# Patient Record
Sex: FEMALE | URBAN - NONMETROPOLITAN AREA
[De-identification: names, ages, dates, MRNs, and addresses within clinical notes are randomized per-mention and may not be internally consistent; named-entity substitution may affect disease eponyms.]

---

## 2023-08-24 ENCOUNTER — HOSPITAL ENCOUNTER (OUTPATIENT)
Dept: WOMENS IMAGING | Age: 63
Discharge: HOME | End: 2023-08-24
Attending: RADIOLOGY

## 2023-08-24 DIAGNOSIS — Z00.6 ENCOUNTER FOR EXAMINATION FOR NORMAL COMPARISON OR CONTROL IN CLINICAL RESEARCH PROGRAM: ICD-10-CM

## 2023-09-06 ENCOUNTER — HOSPITAL ENCOUNTER (OUTPATIENT)
Dept: WOMENS IMAGING | Age: 63
Discharge: HOME | End: 2023-09-06
Attending: RADIOLOGY
Payer: MEDICARE

## 2023-09-06 VITALS — BODY MASS INDEX: 30.04 KG/M2 | HEIGHT: 60 IN | WEIGHT: 153 LBS

## 2023-09-06 DIAGNOSIS — R59.9 LYMPH NODE ENLARGEMENT: ICD-10-CM

## 2023-09-06 DIAGNOSIS — N63.25 MASS OVERLAPPING MULTIPLE QUADRANTS OF LEFT BREAST: ICD-10-CM

## 2023-09-06 DIAGNOSIS — N63.22 MASS OF UPPER INNER QUADRANT OF LEFT BREAST: ICD-10-CM

## 2023-09-06 PROCEDURE — 88305 TISSUE EXAM BY PATHOLOGIST: CPT

## 2023-09-06 PROCEDURE — 76882 US LMTD JT/FCL EVL NVASC XTR: CPT

## 2023-09-06 PROCEDURE — G0279 TOMOSYNTHESIS, MAMMO: HCPCS

## 2023-09-06 PROCEDURE — 88360 TUMOR IMMUNOHISTOCHEM/MANUAL: CPT

## 2023-09-06 PROCEDURE — 19083 BX BREAST 1ST LESION US IMAG: CPT

## 2023-09-06 NOTE — PROGRESS NOTES
Breast Biopsy Flowsheet/Post-Operative Care    Date of Procedure: 9/6/2023  Physician: Dr. Stephanie Taveras  Technologist: Starr Abarca guided breast biopsy  Lesion type: Non-palpable  Breast: left    Clock face position: Site #1: UIQ         Primary Method of Detection: Mammogram      Microcalcification's: no   Distribution: N/A      Biopsy Method:   Sertera:    Site # 1    Gauge: 14    # of Passes: 5     Clip: Geo        Pre-Op Assessment: (BI-RADS)   5.  Highly Suggestive of Malignancy    Patient Tolerated Procedure: good  Complications: n/a  Comments: n/a    Post Operative Care  Steri strips: Yes  Dressing: Gauze, Tape   Ice Applied to Site:  No  Evidence of Bleeding:  No    Pain Verbalized: No      Written Discharge Instructions: Yes  Condition at Discharge: good  Time of Discharge: [address]    Electronically signed by Angel Jefferson on 9/6/2023 at 11:31 AM

## 2023-09-06 NOTE — PROGRESS NOTES
[facility] [address] [place] [address]  Pre-Biopsy Assessment      Patient Education    Written information about procedure Yes  left   Procedural steps explained Yes Ultrasound Biopsy   Post-op potential: bruising, hematoma, pain Yes    Self-care: activity, care of dressing Yes    Patient verbalized understanding Yes    Consent signed and witnessed Yes      Hormone Therapy Status: n/a    Recent Medication: N/A Last Dose: n/a                                     Hormone Replacement Therapy: no    Previous Breast Biopsy: no    Previous Diagnosis Cancer: no    Hysterectomy:no    Emotional Status: Nervous    Language or Physical Barriers: n/a    Comments: n/a      Electronically signed by May Blackmon on 9/6/2023 at 11:30 AM

## 2023-09-07 ENCOUNTER — CLINICAL DOCUMENTATION (OUTPATIENT)
Dept: WOMENS IMAGING | Age: 63
End: 2023-09-07

## 2023-09-08 ENCOUNTER — CLINICAL DOCUMENTATION (OUTPATIENT)
Dept: CASE MANAGEMENT | Age: 63
End: 2023-09-08

## 2023-09-08 NOTE — PROGRESS NOTES
Name: Zeinab Virk  : 1960  MRN: [id number]    Oncology Navigation Follow-Up Note    Contact Type:  Telephone    Subjective: Called patient to introduce myself and navigation. Diagnosed with breast cancer 23, received results from [facility] . States \"I want to know is this curable? Can it be treated? How often is radiation? \"    Objective: left IDC and DCIS, ER+ MD+ HER2-, Sees Dr. Sonali Hansen  at 1 pm.    Assistance Needed: Transportation for radiation. States \" I have drivers but they cant do it daily. \" Denies any other needs at this time. Receptive to Advanced Care Planning / Palliative Care:  N/A, clinical stage 1A    Genetics/Family History: Sister metastatic breast cancer in her late 46s-, MGM ovarian cancer, age unknown-, father lung cancer-. Meets criteria for genetic testing. Interested in talking with Dr. Sonali Hansen further but willing to do. Education: Genetic testing, radiation, breast team's recommendations, sequence of treatment modalities    Referrals:  notified of above need, spiritual care per protocol. Notes: Questions addressed and verbalizes understanding. States can't come for teaching before Dr. Sonali Hansen appointment but can after. Address and location of [address] [place] [address] Rd given. Notified Irl Endy at surgeon's office. Emotional support offered. Expressed she has  and daughter (21yo)  for support also. Thanked me for the call and information. Will follow through continuum of care.     Electronically signed by Arleen Diallo RN on 2023 at 10:42 AM

## 2023-09-11 ENCOUNTER — OFFICE VISIT (OUTPATIENT)
Dept: SURGERY | Age: 63
End: 2023-09-11
Payer: MEDICARE

## 2023-09-11 ENCOUNTER — TELEPHONE (OUTPATIENT)
Dept: SURGERY | Age: 63
End: 2023-09-11

## 2023-09-11 ENCOUNTER — CLINICAL DOCUMENTATION (OUTPATIENT)
Dept: CASE MANAGEMENT | Age: 63
End: 2023-09-11

## 2023-09-11 VITALS
HEIGHT: 60 IN | DIASTOLIC BLOOD PRESSURE: 82 MMHG | BODY MASS INDEX: 30.98 KG/M2 | WEIGHT: 157.8 LBS | HEART RATE: 90 BPM | OXYGEN SATURATION: 96 % | SYSTOLIC BLOOD PRESSURE: 148 MMHG | TEMPERATURE: 97.7 F

## 2023-09-11 DIAGNOSIS — M81.0 OSTEOPOROSIS WITHOUT CURRENT PATHOLOGICAL FRACTURE, UNSPECIFIED OSTEOPOROSIS TYPE: ICD-10-CM

## 2023-09-11 DIAGNOSIS — C50.212 CARCINOMA OF UPPER-INNER QUADRANT OF LEFT BREAST IN FEMALE, ESTROGEN RECEPTOR POSITIVE (HCC): Primary | ICD-10-CM

## 2023-09-11 DIAGNOSIS — Z17.0 CARCINOMA OF UPPER-INNER QUADRANT OF LEFT BREAST IN FEMALE, ESTROGEN RECEPTOR POSITIVE (HCC): Primary | ICD-10-CM

## 2023-09-11 PROCEDURE — 99205 OFFICE O/P NEW HI 60 MIN: CPT | Performed by: SURGERY

## 2023-09-11 PROCEDURE — 1123F ACP DISCUSS/DSCN MKR DOCD: CPT | Performed by: SURGERY

## 2023-09-11 RX ORDER — SERTRALINE HYDROCHLORIDE 25 MG/1
25 TABLET, FILM COATED ORAL DAILY
COMMUNITY
Start: 2023-07-18

## 2023-09-11 RX ORDER — FLUTICASONE PROPIONATE 110 UG/1
2 AEROSOL, METERED RESPIRATORY (INHALATION) 2 TIMES DAILY
COMMUNITY
Start: 2023-08-27

## 2023-09-11 RX ORDER — CETIRIZINE HYDROCHLORIDE 10 MG/1
10 TABLET ORAL DAILY
COMMUNITY

## 2023-09-11 NOTE — PROGRESS NOTES
Name: Delphine Magallon  : 1960  MRN: [id number]    Oncology Navigation- Initial Note:    Intake-  Contact Type:  Cancer Center-Teaching with Navigator    Diagnosis: Breast-malignant    Home Disposition: Lives with other who is able to assist,  [employer]    Patient needs and barriers to care: Coordination of Care, Knowledge deficit, Emotional Issues/ Fear/ Anxiety, and Transportation- already aware-see previous note     Referral Source: Outside Provider    Interventions-   General Interventions: Arrived with  for breast cancer teaching. Just left from appointment with Dr. Tracie Shelton. Scheduled for lumpectomy with sentinel node on . Expressing anxiety about [place]. Unable to change until open enrollment in October. Decided she does not want genetic testing. Education/Screenings:  yes - Breast Cancer Information Packet, Navigation packet, Pre-op: Lymphedema, exercises after breast surgery, post-op pillow, pre-hab, sentinel node, Oncotype DX, genetic evaluation and testing, Breast Cancer and Nutrition. Currently on HRT: no     Continuum of Care: Diagnosis/Active Treatment    Notes: Teaching completed and verbalizes understanding. All questions addressed. States talked to Dr. Tracie Shelton about referral to Dr. Talia Davalos due to insurance. Patient states has history osteoporosis. Takes walks with  often. Discussed antiestrogen pill and starting Calcium and vitamin D supplements for bones. Wants called with recommended dosages. Interested in doing everything she can. Told her I will call once I verify. Asked about having a navigator throughout treatment. Informed her once she transitions to Dr. Talia Davalos his nurses will take over. Will follow through surgery and radiation if has here.      Electronically signed by Jennifer Mitchell RN on 2023 at 3:23 PM pt s/p  and plans to breastfeed

## 2023-09-11 NOTE — PROGRESS NOTES
Maximiliano Garcia MD   General Surgery  New Patient Evaluation in Office  Pt Name: Patel Thomas  Date of Birth 1960   Today's Date: 9/11/2023  Medical Record Number: [id number]  Referring Provider: Dr. Judith Epstein  Primary Care Provider: Gucci Thomas DO  Chief Complaint:  Chief Complaint   Patient presents with    Surgical Consult     NP refer [facility]-Left breast invasive ductal carcinoma       ASSESSMENT      1. 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 Osteoporosis without current pathological fracture, unspecified osteoporosis type       Clinical stage Ia ER positive NE positive HER2 negative  PLANS      Imaging independently reviewed. Discussed with patient surgical treatment options for early stage hormone responsive breast cancer. Breast conservation with sentinel lymph node biopsy postoperative radiation versus mastectomy with or without reconstruction and sentinel lymph node biopsy discussed with patient. She desires to proceed with breast conservation therapy. 3 .oncology rehabilitation  4. Family history reviewed. Patient declines genetic testing. 5.  Discussed bilateral breast MRI patient does have some central densities bilaterally which could obscure additional lesion. She has anxiety some claustrophobia she opted for no MRI at this time. 6.  Oncology prehabilitation. 7.  Postoperative medical and radiation oncology consultations. Discussed need for antiestrogen therapy postoperatively as well as recommendations for radiation therapy with breast conservation surgery. Recommendations for any potential chemotherapy based on final pathology Oncotype DX score. Patient plans on seeing Dr. Alonso Arnold. 8.  Oncotype DX score. 9.  Techniques and risk of procedure were discussed with patient. Risk include but not limited to bleeding, infection, failure to achieve negative margins with need for reexcision.   Recurrence of cancer, poor cosmetic result,

## 2023-09-12 ENCOUNTER — CLINICAL DOCUMENTATION (OUTPATIENT)
Dept: CASE MANAGEMENT | Age: 63
End: 2023-09-12

## 2023-09-12 ENCOUNTER — SOCIAL WORK (OUTPATIENT)
Dept: INFUSION THERAPY | Age: 63
End: 2023-09-12

## 2023-09-12 DIAGNOSIS — R92.2 DENSE BREAST TISSUE: ICD-10-CM

## 2023-09-12 DIAGNOSIS — Z17.0 CARCINOMA OF UPPER-INNER QUADRANT OF LEFT BREAST IN FEMALE, ESTROGEN RECEPTOR POSITIVE (HCC): Primary | ICD-10-CM

## 2023-09-12 DIAGNOSIS — C50.212 CARCINOMA OF UPPER-INNER QUADRANT OF LEFT BREAST IN FEMALE, ESTROGEN RECEPTOR POSITIVE (HCC): Primary | ICD-10-CM

## 2023-09-12 PROBLEM — M81.0 OSTEOPOROSIS WITHOUT CURRENT PATHOLOGICAL FRACTURE: Status: ACTIVE | Noted: 2023-09-12

## 2023-09-12 ASSESSMENT — ENCOUNTER SYMPTOMS
ABDOMINAL PAIN: 0
TROUBLE SWALLOWING: 0
WHEEZING: 0
COUGH: 0
BLOOD IN STOOL: 0
VOMITING: 0
SHORTNESS OF BREATH: 0
NAUSEA: 0
SORE THROAT: 0
VOICE CHANGE: 0
COLOR CHANGE: 0

## 2023-09-12 NOTE — PROGRESS NOTES
- This staff was contacted by Cooper's nurse navigator to assist in transportation tomorrow morning for an MRI appt. - Cooper will be picked up by [facility] around 4:30 for a 5:45 appt. - Upon completion of the MRI she will be transported back to her home. - Continued support will be available from the  if and when needed.

## 2023-09-12 NOTE — PROGRESS NOTES
Name: Misha Ingram  : 1960  MRN: [id number]    Oncology Navigation Follow-Up Note    Contact Type:  Telephone    Notes: Patient called here, very tearful. States \"Not having a good day. Didn't know who else to call. Found out 3 paternal aunts had breast cancer too. With my sister's breast cancer maybe I should have mastectomy instead of lumpectomy. I want the MRI that Dr. Brett Whaley recommended. \" Encouraged genetic testing if debating about which surgery to help with decision. Agreed to do. Worried about getting surgery in time since has [employer]. Can order stat panel per protocol. Emotional support offered. Called Adam West of patient's request for MRI.       4:23 pm MRI had cancellation for tomorrow at 6 am, patient to arrive by 530 am. Called patient, appointment information and location given. Coming for genetic lab after MRI.      Electronically signed by Mark Crisostomo RN on 2023 at 4:18 PM

## 2023-09-13 ENCOUNTER — HOSPITAL ENCOUNTER (OUTPATIENT)
Dept: MRI IMAGING | Age: 63
Discharge: HOME | End: 2023-09-13
Attending: SURGERY
Payer: MEDICARE

## 2023-09-13 ENCOUNTER — CLINICAL DOCUMENTATION (OUTPATIENT)
Dept: CASE MANAGEMENT | Age: 63
End: 2023-09-13

## 2023-09-13 DIAGNOSIS — Z17.0 CARCINOMA OF UPPER-INNER QUADRANT OF LEFT BREAST IN FEMALE, ESTROGEN RECEPTOR POSITIVE (HCC): ICD-10-CM

## 2023-09-13 DIAGNOSIS — R92.2 DENSE BREAST TISSUE: ICD-10-CM

## 2023-09-13 DIAGNOSIS — C50.212 CARCINOMA OF UPPER-INNER QUADRANT OF LEFT BREAST IN FEMALE, ESTROGEN RECEPTOR POSITIVE (HCC): ICD-10-CM

## 2023-09-13 DIAGNOSIS — Z80.3 FAMILY HISTORY OF MALIGNANT NEOPLASM OF BREAST: ICD-10-CM

## 2023-09-13 DIAGNOSIS — Z85.3 PERSONAL HISTORY OF MALIGNANT NEOPLASM OF BREAST: ICD-10-CM

## 2023-09-13 DIAGNOSIS — Z80.41 FAMILY HISTORY OF MALIGNANT NEOPLASM OF OVARY: ICD-10-CM

## 2023-09-13 PROCEDURE — A9579 GAD-BASE MR CONTRAST NOS,1ML: HCPCS | Performed by: SURGERY

## 2023-09-13 PROCEDURE — C8908 MRI W/O FOL W/CONT, BREAST,: HCPCS

## 2023-09-13 PROCEDURE — 6360000004 HC RX CONTRAST MEDICATION: Performed by: SURGERY

## 2023-09-13 RX ADMIN — GADOTERIDOL 15 ML: 279.3 INJECTION, SOLUTION INTRAVENOUS at 07:00

## 2023-09-13 NOTE — PROGRESS NOTES
Name: Herminio Rosado  : 1960  MRN: [id number]    Oncology Navigation Follow-Up Note    Contact Type:  Medical Oncology-genetic lab draw     Notes: Wants genetic testing.  collected blood sample with Neri Kit and labeled. Navigator packaged kit per protocol, including copy of order, pedigree, pathology, and insurance information. Scheduled [employer] pickup for today, tracking number in media. Radha Billet brochure, billing sheet, and genetic counseling scheduling instructions for pre-test session. Patient asked for help scheduling. Pre-test session 9/15. Informed when results available will get call to schedule post-test session. Verbalizes understanding. Dosages and instructions given for Calcium and Vitamin D per patient request. Going to start now per patient.     Electronically signed by Patrick Mott RN on 2023 at 8:18 AM

## 2023-09-14 ENCOUNTER — TELEPHONE (OUTPATIENT)
Dept: SURGERY | Age: 63
End: 2023-09-14

## 2023-09-14 NOTE — TELEPHONE ENCOUNTER
Spoke w/ Dr. Greenwood Webster regarding MRI results, she would like this new area localized day of surgery also, ok for both needle localizations to be under MRI guidance    Spoke w/ pt, informed her of above, due to scheduling conflicts and genetic testing results possibly not being back by the 20th it's in her best interest to r/s surgery to 9/27. Pt is in agreement. Spoke w/ Adrianna Monroy at [facility] to get MRI needle loc x2 scheduled for 9/27 at 8 am.   SLN injection rescheduled to 9/27 at 9 am.   I asked pt to arrive to [facility] on 9/27 at 7:15 am. She voiced understanding.

## 2023-09-15 ENCOUNTER — TELEPHONE (OUTPATIENT)
Dept: CASE MANAGEMENT | Age: 63
End: 2023-09-15

## 2023-09-15 NOTE — TELEPHONE ENCOUNTER
Name: Oskar Gutiérrez  : 1960  MRN: [id number]    Oncology Navigation Follow-Up Note    Contact Type:  Telephone    Notes: Patient assisted with scheduling pre GIS with Emmett Hedrick.     Electronically signed by Andrew Patino RN on 9/15/2023 at 3:15 PM

## 2023-09-19 ENCOUNTER — SOCIAL WORK (OUTPATIENT)
Dept: INFUSION THERAPY | Age: 63
End: 2023-09-19

## 2023-09-19 ENCOUNTER — HOSPITAL ENCOUNTER (OUTPATIENT)
Dept: PHYSICAL THERAPY | Age: 63
Setting detail: THERAPIES SERIES
Discharge: HOME | End: 2023-09-19
Payer: MEDICARE

## 2023-09-19 LAB
Lab: NEGATIVE
Lab: NORMAL

## 2023-09-19 PROCEDURE — 97110 THERAPEUTIC EXERCISES: CPT

## 2023-09-19 PROCEDURE — 97161 PT EVAL LOW COMPLEX 20 MIN: CPT

## 2023-09-19 NOTE — PROGRESS NOTES
Oncology Social Work    Date: 9/19/2023  Time: 12:19 PM  Name: Corazon Mathis  MRN: [id number]     Contact Type: Follow-up    Note:   Situation: This staff called Corazon Mathis via phone support to introduce myself as her Oncology Social Worker. Background:  Cooper was referred to this staff by the General Surgery Dept to complete her Distress Thermometer she received during her initial appointment. This staff was calling to assess her self-identified distress concerns. Assessment: There was also no opportunity to discuss directly with Cooper since the attempt to contact her went directly to [facility].   - Education regarding the services provided by the SW were relayed on the message left for her.  - No community referrals were placed at this time. Referral services may be reconsidered should she contact me regarding assistance. This patient has already needed assistance in transportation so encouraging her to utilize the local transportation through the [id number] [employer] will be addressed. Recommendation: Follow-up will be initiated by Cooper based on need.  provided her with my contact information and will remain available for support.         JOSE LUIS Dennis LSW, ACHP-SW  Oncology Social Worker      Electronically signed by JOSE LUIS Dennis LSW, ACHP-SW on 9/19/2023 at 12:19 PM

## 2023-09-19 NOTE — PROGRESS NOTES
further oncologic treatment to allow full return to her previous level of activities for quality of life with survivorship. Body Structures/Functions/Activity Limitations:Lymphedema Risk  Prognosis: good    GOALS:  Patient Goal: \"Have good movement. \"    Short Term Goals to be met in 12 weeks:  See LTGs    Long Term Goals to be met in 12 weeks:   Pt to demo left shoulder PROM flexion returned to 180 deg after surgery for ADLs. Pt to demo left shoulder PROM abduction returned to 180 deg after surgery for ADLs. Pt to demo left shoulder strength returned to 5/5 after surgery for IADLs. Pt to demo QuickDASH score returned to 0% after surgery for return to previous level of functioning for survivorship. Pt to demo left UE lymphedema measures controlled at <143 cm with independence with individualized lymphedema risk reduction strategies based on personal risk factors and when to seek medical attention for assessment of early signs of lymphedema or cellulitis in the affected region. Patient Education: Plan of care, goals. See \"Treatment Initiated\" for further details. Education Outcome: Verbalized understanding  Education Barriers: None    PLAN:  Treatment Recommendations: Strengthening, Range of Motion, Conditioning, Lymphedema Management, Manual Techniques, Pain Management, Neuropathy Management, Postural Re-Training, Body Mechanics/Ergonomics, Home Exercise Prescription, and Safety Education    Plan of care initiated. Plan to see patient up to 2 times per week for up to 12 weeks to address the treatment planned outlined above, with next appointment to be 3 weeks following surgery per protocol.     Time In 1100   Time Out 1155   Timed Code Minutes: 10 min   Total Treatment Time: 55 min       Electronically Signed by: Reyna Kelly, PT  9/19/2023

## 2023-09-20 ENCOUNTER — TELEPHONE (OUTPATIENT)
Dept: CASE MANAGEMENT | Age: 63
End: 2023-09-20

## 2023-09-20 ENCOUNTER — APPOINTMENT (OUTPATIENT)
Dept: WOMENS IMAGING | Age: 63
End: 2023-09-20
Attending: SURGERY
Payer: MEDICARE

## 2023-09-20 NOTE — TELEPHONE ENCOUNTER
Name: Sarah Current  : 1960  MRN: [id number]    Oncology Navigation Follow-Up Note    Contact Type:  Telephone    Notes: Scheduled patient for post-genetic session with Janel Patel to review results on  at 10 am. Verbalizes understanding.      Electronically signed by Juancho Denton RN on 2023 at 1:15 PM

## 2023-09-27 ENCOUNTER — HOSPITAL ENCOUNTER (OUTPATIENT)
Age: 63
Setting detail: OUTPATIENT SURGERY
Discharge: HOME | End: 2023-09-27
Attending: SURGERY | Admitting: SURGERY
Payer: MEDICARE

## 2023-09-27 ENCOUNTER — ANESTHESIA (OUTPATIENT)
Dept: OPERATING ROOM | Age: 63
End: 2023-09-27
Payer: MEDICARE

## 2023-09-27 ENCOUNTER — HOSPITAL ENCOUNTER (OUTPATIENT)
Dept: WOMENS IMAGING | Age: 63
Discharge: HOME | End: 2023-09-27
Attending: SURGERY
Payer: MEDICARE

## 2023-09-27 ENCOUNTER — HOSPITAL ENCOUNTER (OUTPATIENT)
Dept: MRI IMAGING | Age: 63
Discharge: HOME | End: 2023-09-27
Attending: SURGERY
Payer: MEDICARE

## 2023-09-27 ENCOUNTER — HOSPITAL ENCOUNTER (OUTPATIENT)
Dept: NUCLEAR MEDICINE | Age: 63
Discharge: HOME | End: 2023-09-27
Attending: SURGERY
Payer: MEDICARE

## 2023-09-27 ENCOUNTER — ANESTHESIA EVENT (OUTPATIENT)
Dept: OPERATING ROOM | Age: 63
End: 2023-09-27
Payer: MEDICARE

## 2023-09-27 VITALS
OXYGEN SATURATION: 96 % | DIASTOLIC BLOOD PRESSURE: 85 MMHG | RESPIRATION RATE: 20 BRPM | SYSTOLIC BLOOD PRESSURE: 158 MMHG | HEART RATE: 96 BPM | TEMPERATURE: 98.3 F

## 2023-09-27 VITALS
RESPIRATION RATE: 20 BRPM | DIASTOLIC BLOOD PRESSURE: 82 MMHG | HEIGHT: 60 IN | BODY MASS INDEX: 32.2 KG/M2 | TEMPERATURE: 97 F | OXYGEN SATURATION: 95 % | SYSTOLIC BLOOD PRESSURE: 159 MMHG | WEIGHT: 164 LBS | HEART RATE: 94 BPM

## 2023-09-27 DIAGNOSIS — Z17.0 MALIGNANT NEOPLASM OF UPPER-INNER QUADRANT OF LEFT BREAST IN FEMALE, ESTROGEN RECEPTOR POSITIVE (HCC): ICD-10-CM

## 2023-09-27 DIAGNOSIS — C50.212 MALIGNANT NEOPLASM OF UPPER-INNER QUADRANT OF LEFT BREAST IN FEMALE, ESTROGEN RECEPTOR POSITIVE (HCC): ICD-10-CM

## 2023-09-27 DIAGNOSIS — C50.912 INVASIVE DUCTAL CARCINOMA OF BREAST, LEFT (HCC): ICD-10-CM

## 2023-09-27 DIAGNOSIS — C50.212 CARCINOMA OF UPPER-INNER QUADRANT OF LEFT BREAST IN FEMALE, ESTROGEN RECEPTOR POSITIVE (HCC): Primary | ICD-10-CM

## 2023-09-27 DIAGNOSIS — Z17.0 CARCINOMA OF UPPER-INNER QUADRANT OF LEFT BREAST IN FEMALE, ESTROGEN RECEPTOR POSITIVE (HCC): Primary | ICD-10-CM

## 2023-09-27 DIAGNOSIS — C50.212 CARCINOMA OF UPPER-INNER QUADRANT OF LEFT BREAST IN FEMALE, ESTROGEN RECEPTOR POSITIVE (HCC): ICD-10-CM

## 2023-09-27 DIAGNOSIS — Z17.0 CARCINOMA OF UPPER-INNER QUADRANT OF LEFT BREAST IN FEMALE, ESTROGEN RECEPTOR POSITIVE (HCC): ICD-10-CM

## 2023-09-27 PROCEDURE — 2500000003 HC RX 250 WO HCPCS: Performed by: SURGERY

## 2023-09-27 PROCEDURE — 3430000000 HC RX DIAGNOSTIC RADIOPHARMACEUTICAL: Performed by: SURGERY

## 2023-09-27 PROCEDURE — 2500000003 HC RX 250 WO HCPCS: Performed by: NURSE ANESTHETIST, CERTIFIED REGISTERED

## 2023-09-27 PROCEDURE — 6360000002 HC RX W HCPCS: Performed by: NURSE ANESTHETIST, CERTIFIED REGISTERED

## 2023-09-27 PROCEDURE — 19301 PARTIAL MASTECTOMY: CPT | Performed by: SURGERY

## 2023-09-27 PROCEDURE — 6360000002 HC RX W HCPCS: Performed by: SURGERY

## 2023-09-27 PROCEDURE — 2580000003 HC RX 258: Performed by: SURGERY

## 2023-09-27 PROCEDURE — 7100000001 HC PACU RECOVERY - ADDTL 15 MIN: Performed by: SURGERY

## 2023-09-27 PROCEDURE — 2709999900 HC NON-CHARGEABLE SUPPLY: Performed by: SURGERY

## 2023-09-27 PROCEDURE — 3600000012 HC SURGERY LEVEL 2 ADDTL 15MIN: Performed by: SURGERY

## 2023-09-27 PROCEDURE — G0279 TOMOSYNTHESIS, MAMMO: HCPCS

## 2023-09-27 PROCEDURE — 76098 X-RAY EXAM SURGICAL SPECIMEN: CPT

## 2023-09-27 PROCEDURE — 6370000000 HC RX 637 (ALT 250 FOR IP): Performed by: SURGERY

## 2023-09-27 PROCEDURE — 7100000011 HC PHASE II RECOVERY - ADDTL 15 MIN: Performed by: SURGERY

## 2023-09-27 PROCEDURE — 7100000010 HC PHASE II RECOVERY - FIRST 15 MIN: Performed by: SURGERY

## 2023-09-27 PROCEDURE — 88307 TISSUE EXAM BY PATHOLOGIST: CPT

## 2023-09-27 PROCEDURE — 3700000000 HC ANESTHESIA ATTENDED CARE: Performed by: SURGERY

## 2023-09-27 PROCEDURE — A9541 TC99M SULFUR COLLOID: HCPCS | Performed by: SURGERY

## 2023-09-27 PROCEDURE — 38792 RA TRACER ID OF SENTINL NODE: CPT

## 2023-09-27 PROCEDURE — 19285 PERQ DEV BREAST 1ST US IMAG: CPT

## 2023-09-27 PROCEDURE — 19287 PERQ DEV BREAST 1ST MR GUIDE: CPT

## 2023-09-27 PROCEDURE — 7100000000 HC PACU RECOVERY - FIRST 15 MIN: Performed by: SURGERY

## 2023-09-27 PROCEDURE — 3700000001 HC ADD 15 MINUTES (ANESTHESIA): Performed by: SURGERY

## 2023-09-27 PROCEDURE — 38525 BIOPSY/REMOVAL LYMPH NODES: CPT | Performed by: SURGERY

## 2023-09-27 PROCEDURE — 3600000002 HC SURGERY LEVEL 2 BASE: Performed by: SURGERY

## 2023-09-27 RX ORDER — SODIUM CHLORIDE 9 MG/ML
INJECTION, SOLUTION INTRAVENOUS CONTINUOUS
Status: DISCONTINUED | OUTPATIENT
Start: 2023-09-27 | End: 2023-09-27 | Stop reason: HOSPADM

## 2023-09-27 RX ORDER — SODIUM CHLORIDE 0.9 % (FLUSH) 0.9 %
5-40 SYRINGE (ML) INJECTION EVERY 12 HOURS SCHEDULED
Status: DISCONTINUED | OUTPATIENT
Start: 2023-09-27 | End: 2023-09-27 | Stop reason: HOSPADM

## 2023-09-27 RX ORDER — ACETAMINOPHEN 325 MG/1
650 TABLET ORAL EVERY 4 HOURS PRN
Status: DISCONTINUED | OUTPATIENT
Start: 2023-09-27 | End: 2023-09-27 | Stop reason: HOSPADM

## 2023-09-27 RX ORDER — PROPOFOL 10 MG/ML
INJECTION, EMULSION INTRAVENOUS PRN
Status: DISCONTINUED | OUTPATIENT
Start: 2023-09-27 | End: 2023-09-27 | Stop reason: SDUPTHER

## 2023-09-27 RX ORDER — MORPHINE SULFATE 2 MG/ML
2 INJECTION, SOLUTION INTRAMUSCULAR; INTRAVENOUS
Status: DISCONTINUED | OUTPATIENT
Start: 2023-09-27 | End: 2023-09-27 | Stop reason: HOSPADM

## 2023-09-27 RX ORDER — TRAMADOL HYDROCHLORIDE 50 MG/1
50 TABLET ORAL EVERY 6 HOURS PRN
Status: DISCONTINUED | OUTPATIENT
Start: 2023-09-27 | End: 2023-09-27 | Stop reason: HOSPADM

## 2023-09-27 RX ORDER — KETOROLAC TROMETHAMINE 30 MG/ML
INJECTION, SOLUTION INTRAMUSCULAR; INTRAVENOUS PRN
Status: DISCONTINUED | OUTPATIENT
Start: 2023-09-27 | End: 2023-09-27 | Stop reason: SDUPTHER

## 2023-09-27 RX ORDER — ONDANSETRON 2 MG/ML
INJECTION INTRAMUSCULAR; INTRAVENOUS PRN
Status: DISCONTINUED | OUTPATIENT
Start: 2023-09-27 | End: 2023-09-27 | Stop reason: SDUPTHER

## 2023-09-27 RX ORDER — SODIUM CHLORIDE 9 MG/ML
INJECTION, SOLUTION INTRAVENOUS PRN
Status: DISCONTINUED | OUTPATIENT
Start: 2023-09-27 | End: 2023-09-27 | Stop reason: HOSPADM

## 2023-09-27 RX ORDER — SODIUM CHLORIDE 0.9 % (FLUSH) 0.9 %
5-40 SYRINGE (ML) INJECTION PRN
Status: DISCONTINUED | OUTPATIENT
Start: 2023-09-27 | End: 2023-09-27 | Stop reason: HOSPADM

## 2023-09-27 RX ORDER — TRAMADOL HYDROCHLORIDE 50 MG/1
100 TABLET ORAL EVERY 6 HOURS PRN
Status: DISCONTINUED | OUTPATIENT
Start: 2023-09-27 | End: 2023-09-27 | Stop reason: HOSPADM

## 2023-09-27 RX ORDER — ONDANSETRON 2 MG/ML
4 INJECTION INTRAMUSCULAR; INTRAVENOUS EVERY 6 HOURS PRN
Status: DISCONTINUED | OUTPATIENT
Start: 2023-09-27 | End: 2023-09-27 | Stop reason: HOSPADM

## 2023-09-27 RX ORDER — KETAMINE HCL IN NACL, ISO-OSM 100MG/10ML
SYRINGE (ML) INJECTION PRN
Status: DISCONTINUED | OUTPATIENT
Start: 2023-09-27 | End: 2023-09-27 | Stop reason: SDUPTHER

## 2023-09-27 RX ORDER — MORPHINE SULFATE 4 MG/ML
4 INJECTION, SOLUTION INTRAMUSCULAR; INTRAVENOUS
Status: DISCONTINUED | OUTPATIENT
Start: 2023-09-27 | End: 2023-09-27 | Stop reason: HOSPADM

## 2023-09-27 RX ORDER — ONDANSETRON 4 MG/1
4 TABLET, ORALLY DISINTEGRATING ORAL EVERY 8 HOURS PRN
Status: DISCONTINUED | OUTPATIENT
Start: 2023-09-27 | End: 2023-09-27 | Stop reason: HOSPADM

## 2023-09-27 RX ORDER — GLYCOPYRROLATE 1 MG/5 ML
SYRINGE (ML) INTRAVENOUS PRN
Status: DISCONTINUED | OUTPATIENT
Start: 2023-09-27 | End: 2023-09-27 | Stop reason: SDUPTHER

## 2023-09-27 RX ORDER — DEXAMETHASONE SODIUM PHOSPHATE 4 MG/ML
INJECTION, SOLUTION INTRA-ARTICULAR; INTRALESIONAL; INTRAMUSCULAR; INTRAVENOUS; SOFT TISSUE PRN
Status: DISCONTINUED | OUTPATIENT
Start: 2023-09-27 | End: 2023-09-27 | Stop reason: SDUPTHER

## 2023-09-27 RX ORDER — FENTANYL CITRATE 50 UG/ML
INJECTION, SOLUTION INTRAMUSCULAR; INTRAVENOUS PRN
Status: DISCONTINUED | OUTPATIENT
Start: 2023-09-27 | End: 2023-09-27 | Stop reason: SDUPTHER

## 2023-09-27 RX ORDER — LIDOCAINE HCL/PF 100 MG/5ML
SYRINGE (ML) INJECTION PRN
Status: DISCONTINUED | OUTPATIENT
Start: 2023-09-27 | End: 2023-09-27 | Stop reason: SDUPTHER

## 2023-09-27 RX ORDER — TRAMADOL HYDROCHLORIDE 50 MG/1
50 TABLET ORAL EVERY 4 HOURS PRN
Qty: 18 TABLET | Refills: 0 | Status: SHIPPED | OUTPATIENT
Start: 2023-09-27 | End: 2023-09-30

## 2023-09-27 RX ORDER — ESMOLOL HYDROCHLORIDE 10 MG/ML
INJECTION INTRAVENOUS PRN
Status: DISCONTINUED | OUTPATIENT
Start: 2023-09-27 | End: 2023-09-27 | Stop reason: SDUPTHER

## 2023-09-27 RX ORDER — MIDAZOLAM HYDROCHLORIDE 1 MG/ML
INJECTION INTRAMUSCULAR; INTRAVENOUS PRN
Status: DISCONTINUED | OUTPATIENT
Start: 2023-09-27 | End: 2023-09-27 | Stop reason: SDUPTHER

## 2023-09-27 RX ADMIN — Medication 2000 MG: at 12:53

## 2023-09-27 RX ADMIN — Medication 0.88 MILLICURIE: at 11:05

## 2023-09-27 RX ADMIN — ESMOLOL HYDROCHLORIDE 50 MG: 10 INJECTION, SOLUTION INTRAVENOUS at 13:01

## 2023-09-27 RX ADMIN — TRAMADOL HYDROCHLORIDE 100 MG: 50 TABLET, COATED ORAL at 15:17

## 2023-09-27 RX ADMIN — PROPOFOL 50 MG: 10 INJECTION, EMULSION INTRAVENOUS at 12:45

## 2023-09-27 RX ADMIN — DEXAMETHASONE SODIUM PHOSPHATE 8 MG: 4 INJECTION, SOLUTION INTRAMUSCULAR; INTRAVENOUS at 12:43

## 2023-09-27 RX ADMIN — KETOROLAC TROMETHAMINE 15 MG: 30 INJECTION, SOLUTION INTRAMUSCULAR; INTRAVENOUS at 13:34

## 2023-09-27 RX ADMIN — Medication 15 MG: at 12:43

## 2023-09-27 RX ADMIN — ESMOLOL HYDROCHLORIDE 30 MG: 10 INJECTION, SOLUTION INTRAVENOUS at 12:43

## 2023-09-27 RX ADMIN — PROPOFOL 150 MG: 10 INJECTION, EMULSION INTRAVENOUS at 12:43

## 2023-09-27 RX ADMIN — ONDANSETRON 4 MG: 2 INJECTION INTRAMUSCULAR; INTRAVENOUS at 13:34

## 2023-09-27 RX ADMIN — Medication 80 MG: at 12:43

## 2023-09-27 RX ADMIN — FENTANYL CITRATE 25 MCG: 50 INJECTION, SOLUTION INTRAMUSCULAR; INTRAVENOUS at 13:22

## 2023-09-27 RX ADMIN — Medication 0.2 MG: at 13:36

## 2023-09-27 RX ADMIN — SODIUM CHLORIDE: 9 INJECTION, SOLUTION INTRAVENOUS at 12:19

## 2023-09-27 RX ADMIN — ESMOLOL HYDROCHLORIDE 20 MG: 10 INJECTION, SOLUTION INTRAVENOUS at 13:17

## 2023-09-27 RX ADMIN — MIDAZOLAM 1 MG: 1 INJECTION INTRAMUSCULAR; INTRAVENOUS at 12:39

## 2023-09-27 ASSESSMENT — PAIN DESCRIPTION - ORIENTATION
ORIENTATION: LEFT

## 2023-09-27 ASSESSMENT — PAIN DESCRIPTION - LOCATION
LOCATION: BREAST

## 2023-09-27 ASSESSMENT — PAIN SCALES - GENERAL
PAINLEVEL_OUTOF10: 8
PAINLEVEL_OUTOF10: 7
PAINLEVEL_OUTOF10: 4

## 2023-09-27 ASSESSMENT — PAIN DESCRIPTION - PAIN TYPE: TYPE: ACUTE PAIN

## 2023-09-27 ASSESSMENT — PAIN DESCRIPTION - DESCRIPTORS
DESCRIPTORS: SORE
DESCRIPTORS: ACHING;SORE

## 2023-09-27 ASSESSMENT — PAIN - FUNCTIONAL ASSESSMENT
PAIN_FUNCTIONAL_ASSESSMENT: 0-10
PAIN_FUNCTIONAL_ASSESSMENT: 0-10

## 2023-09-27 NOTE — PROGRESS NOTES
1356 - pt arrives to pacu, respirations unlabored on 8 L simple mask, pt denies pain, VSS    1410 - pt denies pain at this time    1426 - pt meets criteria for discharge from pacu at this time, pt transported to [facility] in stable condition

## 2023-09-27 NOTE — OP NOTE
[facility]  Operative Report    PATIENT NAME: Kirsten Mcmanus RECORD NO. 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 Additional MRI detected lesion. POSTOPERATIVE DIAGNOSIS: Same, path pending  SURGEON:  Fermin Cardoso MD MD   ANESTHESIA:  General LMA anesthesia and local  ANESTHESIA:  30  ml OF 0.5% Marcaine and 1% xylocaine in equal parts  ESTIMATED BLOOD LOSS:  50  ml  SPECIMEN: to pathology  COMPLICATIONS:  None; patient tolerated the procedure well. DRAINS: none  DISPOSITION: Recovery Room  CONDITION: stable      Narrative:    Procedure: Patient was brought to operating suite placed supine on the operating table after wire localization and injection with Dr. Waleska Khan sulfur colloid was performed in the left breast preoperatively. She was placed supine on the operating table with pneumatic sequential compression devices on lower extremities. She was given Ancef intravenously. After induction of general anesthesia left breast and axilla were prepped and draped and timeout was performed. Utilizing the neoprobe to guide the incision incision was made the left axilla dissection was carried down to the clavipectoral fracture and through to the deep axilla. A sentinel lymph node with ex vivo counts of around 185 was removed there were no palpable enlarged lymph nodes no further hot lymph nodes. Wound was irrigated and skin was closed in layers of absorbable suture. The dermis had been closed with interrupted 3-0 Vicryl suture.   Skin glue was applied at the end of the procedure after infiltrated with local anesthetic. Attention was then turned towards the lumpectomy on the left. Skin incision was made there were 3 wires in the breast.  After skin incision was made wires were delivered in the operative field lumpectomy including all 3 wires was taken as 1 specimen. Specimen was oriented at with markers. Clips were placed in the lumpectomy cavity. Specimen was sent for specimen radiograph. Reported by radiology contain both areas of interest in all 3 wires and the barbell clip. Additional margin had been taken in the area of the documented tumor specimen was somewhat bivalved. After the wound was irrigated dermis was closed with interrupted 3-0 Vicryl suture and skin was closed running subcuticular 4-0 Monocryl suture. Skin glue was applied. Patient was spontaneously breathing and transported to the recovery area in stable condition. Synoptic QI [place] Lymph Node Biopsy for Breast Cancer  Element Response   Operation performed with curative intent Yes   Tracer(s) used to identify sentinel nodes in the upfront surgery (non-neoadjuvant) setting (select all that apply) Radioactive tracer   Tracer(s) used to identify sentinel nodes in the neoadjuvant setting (select all that apply) Radioactive tracer   All nodes (colored nodes or non colored) present at the end of a dye-filled lymphatic channel were removed. Not Applicable   All significantly radioactive nodes were removed. Yes   All palpable suspicious nodes were removed Not Applicable   Biopsy-proven positive nodes marked with clips prior to chemotherapy were identified and removed.   Not Applicable

## 2023-09-27 NOTE — PROGRESS NOTES
Patient oriented to Same Day department and admitted to Same Day Surgery room 12. Patient verbalized approval for first name, last initial with physician name on unit whiteboard. Plan of care reviewed with patient. Patient room whiteboard filled out and discussed with patient and responsible adult. Patient and responsible adult offered Same Day Welcome Packet to review. Call light in reach. Bed in lowest position, locked, with one bed rail up. SCDs and warming blanket in place. Appropriate arm bands on patient. Bathroom offered. All questions and concerns of patient addressed. Meds to Beds:   Patient informed of [facility] program during admission.  Patient is agreeable to program.   Contact information for the pharmacy and the [facility] to [facility] program:   Name: Clarinda Hamman    Relationship to patient:spouse/significant other   Phone number: [phone]

## 2023-09-27 NOTE — H&P
advanced under ultrasound guidance. Once the needle was   documented to be in the correct location, 4 samples were taken from the   area of interest. Samples were   placed in formalin sent to pathology. A barbell biopsy marker was placed   at the biopsy site. Clip placement was confirmed with a left digital diagnostic mammogram. The   mammogram was performed as a separate procedure in a separate room with a   dedicated machine. The patient tolerated the procedure well. No evidence of immediate   complication. The patient was given post-procedure instructions, including   wound care. POSTPROCEDURE MAMMOGRAM:     Images of the left include a CC view and LM view. Tomosynthesis. CAD was   utilized. The tissue of the breast(s) is heterogeneously dense. This may lower the   sensitivity of mammography. Post procedure mammograms were taken in a   separate room. There is a barbell biopsy clip at the biopsy site in the   upper inner left breast, middle depth. This corresponds with the original mammographic density. There are no other significant findings in the breast.        IMPRESSION:  1. Successful left breast ultrasound guided core needle biopsy. 2. Successful marker clip placement with confirmation by digital diagnostic mammogram.         Waiting for pathology results. A final report will be issued when these become available. BI-RADS Final Assessment Category: Post Procedure Mammogram for Marker Placement, Waiting for pathology. Management Recommendation: Assess radiologic/pathologic concordance. **This report has been created using voice recognition software. It may contain minor errors which are inherent in voice recognition technology. **     Final report electronically signed by Dr. Frandy Finley on 9/6/2023 9:37 AM                Exam Ended: 09/06/23 09:01 EDT Last Resulted: 09/07/23 15:17 EDT       Order Details     View Encounter     Lab and Collection Details Reading Physician Reading Date Result Priority   Geovany Moise MD  [phone] 9/6/2023        Narrative & Impression  LOCATION: [facility]     PROCEDURE: JOVON US EXTREMITY LEFT NON VASC LIMITED     CLINICAL INFORMATION: Lymph node enlargement . Left breast mass. CLINICAL: Left axilla US prior to breast bx   PATIENT MEDICAL HISTORY: No relevant medical history has been documented for this patient. FAMILY HISTORY: Family medical history includes breast cancer in sister. RISK VALUES: Tyrer-Cuzick 10yr.: 8.03%, Tyrer-Cuzick life: 18.57 %     COMPARISON: 8/23/2023, 8/11/2023, 11/6/2018. TECHNIQUE: Targeted ultrasound of the left axilla was performed. Grayscale images and color images of the real-time examination were reviewed. FINDINGS:      2 normal lymph nodes are seen in the left axilla. The maximum cortical thickness is 2.2 mm. Cortical thickness is uniform. Both lymph nodes have a fatty hilum. IMPRESSION:  No sonographic evidence of malignancy. Normal-appearing left axillary lymph nodes. The patient is scheduled for a left breast biopsy with us today. The patient was notified of these results. BI-RADS CATEGORY 1: NEGATIVE. **This report has been created using voice recognition software. It may contain minor errors which are inherent in voice recognition technology. **.**

## 2023-09-28 ENCOUNTER — TELEPHONE (OUTPATIENT)
Dept: SURGERY | Age: 63
End: 2023-09-28

## 2023-09-28 NOTE — TELEPHONE ENCOUNTER
Called pt for S/P Left partial mastectomy, [place] lymph node mapping and deep axillary lymph node biopsy-9/27/23. Pt stated no concerns at this time. Advised if taking any pain meds to add a stool softener with it. Pt notified of f/u appt time and date. Advised to call if any questions or concerns.

## 2023-10-03 DIAGNOSIS — Z17.0 CARCINOMA OF UPPER-INNER QUADRANT OF LEFT BREAST IN FEMALE, ESTROGEN RECEPTOR POSITIVE (HCC): ICD-10-CM

## 2023-10-03 DIAGNOSIS — Z17.0 CARCINOMA OF UPPER-INNER QUADRANT OF LEFT BREAST IN FEMALE, ESTROGEN RECEPTOR POSITIVE (HCC): Primary | ICD-10-CM

## 2023-10-03 DIAGNOSIS — C50.212 CARCINOMA OF UPPER-INNER QUADRANT OF LEFT BREAST IN FEMALE, ESTROGEN RECEPTOR POSITIVE (HCC): ICD-10-CM

## 2023-10-03 DIAGNOSIS — C50.212 CARCINOMA OF UPPER-INNER QUADRANT OF LEFT BREAST IN FEMALE, ESTROGEN RECEPTOR POSITIVE (HCC): Primary | ICD-10-CM

## 2023-10-05 ENCOUNTER — OFFICE VISIT (OUTPATIENT)
Dept: SURGERY | Age: 63
End: 2023-10-05

## 2023-10-05 VITALS
OXYGEN SATURATION: 97 % | BODY MASS INDEX: 30.88 KG/M2 | WEIGHT: 157.3 LBS | DIASTOLIC BLOOD PRESSURE: 62 MMHG | TEMPERATURE: 96.9 F | HEART RATE: 102 BPM | RESPIRATION RATE: 16 BRPM | SYSTOLIC BLOOD PRESSURE: 122 MMHG | HEIGHT: 60 IN

## 2023-10-05 DIAGNOSIS — Z48.89 ENCOUNTER FOR POSTOPERATIVE CARE: ICD-10-CM

## 2023-10-05 DIAGNOSIS — M81.0 OSTEOPOROSIS WITHOUT CURRENT PATHOLOGICAL FRACTURE, UNSPECIFIED OSTEOPOROSIS TYPE: ICD-10-CM

## 2023-10-05 DIAGNOSIS — Z17.0 CARCINOMA OF UPPER-INNER QUADRANT OF LEFT BREAST IN FEMALE, ESTROGEN RECEPTOR POSITIVE (HCC): Primary | ICD-10-CM

## 2023-10-05 DIAGNOSIS — C50.212 CARCINOMA OF UPPER-INNER QUADRANT OF LEFT BREAST IN FEMALE, ESTROGEN RECEPTOR POSITIVE (HCC): Primary | ICD-10-CM

## 2023-10-05 PROCEDURE — 99024 POSTOP FOLLOW-UP VISIT: CPT | Performed by: SURGERY

## 2023-10-05 NOTE — PROGRESS NOTES
lateral margin.  ss.  LUCIF/DKR:[id number]     Fixative:  10% neutral buffered formalin   Tissue removal time:  13:23   Tissue fixation time:  13:56   Total fixation time: 30 hours, 4 minutes     Microscopic Examination:   A. Sections demonstrate portions of lymph node which are partially   fatty replaced. The sinuses are open. There is no evidence of   malignancy.      B. CASE SUMMARY: (INVASIVE CARCINOMA OF THE BREAST: Resection)   SPECIMEN   Procedure   Excision (less than total mastectomy)   Specimen Laterality   Left     TUMOR   Tumor Site   Upper inner quadrant   Histologic Type   Invasive carcinoma of no special type (ductal)   Histologic Grade (Rene Histologic Score)   [place] Score    Glandular (Acinar) / Tubular Differentiation    Score 1 (greater than 75% of tumor area forming glandular / tubular   structures)    Nuclear Pleomorphism    Score 1 (Nuclei small with little increase in size in comparison with    normal breast epithelial cells, regular outlines, uniform nuclear    chromatin, little variation in size)    Mitotic Rate    Score 1    Overall Grade    Grade 1 (scores of 3, 4 or 5)   Tumor Size   Greatest dimension of largest invasive focus greater than 1 mm (specify   exact measurement in Millimeters (mm)): 14 mm   Tumor Focality   Single focus of invasive carcinoma   Ductal Carcinoma In Situ (DCIS)   Present    Negative for extensive intraductal component (EIC)   Size (Extent) of DCIS    Estimated size (extent) of DCIS is at least in Millimeters (mm): 2 mm    Architectural Patterns    Cribriform      Nuclear Grade    Grade I (low)    Necrosis    Not identified   Lobular Carcinoma In Situ (LCIS)   Not identified   Tumor Extent    Tumor Extent    Not applicable (skin, nipple, and skeletal muscle are absent OR are   uninvolved)   Lymphatic and / or Vascular Invasion   Not identified    Dermal Lymphatic and / or Vascular Invasion   No skin present   Microcalcifications   Present in invasive

## 2023-10-16 ENCOUNTER — HOSPITAL ENCOUNTER (OUTPATIENT)
Dept: RADIATION ONCOLOGY | Age: 63
Discharge: HOME | End: 2023-10-16
Payer: MEDICARE

## 2023-10-16 VITALS
BODY MASS INDEX: 31.22 KG/M2 | HEART RATE: 99 BPM | OXYGEN SATURATION: 96 % | SYSTOLIC BLOOD PRESSURE: 129 MMHG | HEIGHT: 60 IN | WEIGHT: 159 LBS | TEMPERATURE: 98.2 F | DIASTOLIC BLOOD PRESSURE: 68 MMHG | RESPIRATION RATE: 16 BRPM

## 2023-10-16 DIAGNOSIS — Z17.0 CARCINOMA OF UPPER-INNER QUADRANT OF LEFT BREAST IN FEMALE, ESTROGEN RECEPTOR POSITIVE (HCC): Primary | ICD-10-CM

## 2023-10-16 DIAGNOSIS — C50.212 CARCINOMA OF UPPER-INNER QUADRANT OF LEFT BREAST IN FEMALE, ESTROGEN RECEPTOR POSITIVE (HCC): Primary | ICD-10-CM

## 2023-10-16 PROCEDURE — 99203 OFFICE O/P NEW LOW 30 MIN: CPT | Performed by: RADIOLOGY

## 2023-10-16 PROCEDURE — 99202 OFFICE O/P NEW SF 15 MIN: CPT | Performed by: RADIOLOGY

## 2023-10-16 ASSESSMENT — ENCOUNTER SYMPTOMS
ABDOMINAL PAIN: 0
VOMITING: 0
BACK PAIN: 0
BLOOD IN STOOL: 0
RECTAL PAIN: 0
TROUBLE SWALLOWING: 0
SHORTNESS OF BREATH: 0
NAUSEA: 0
COUGH: 0
DIARRHEA: 0

## 2023-10-16 NOTE — PROGRESS NOTES
role of adjuvant radiation therapy  -We reviewed all of her most recent imaging and surgical pathology  -Adjuvant radiation therapy is recommended, will consider whole breast (3 weeks) vs APBI  -Has appointment with [facility] to discuss role of adjuvant systemic therapy  -Will need further time to heal from recent surgery, CT simulation for RT planning in 2 weeks  -Continue care as per all other treating physicians    We discussed the risks, benefits, and rationale for proceeding with radiation therapy and all of their questions and concerns were answered and addressed to their satisfaction. Consent was signed at today's visit with CT simulation for treatment planning to be performed in the next 1-2 weeks. They have our clinic number to call with any questions or concerns if they were to have any prior to next visit. Thank you for allowing my assistance in the care of your patient. HISTORY OF PRESENT ILLNESS:  Oncology History Overview Note   09/14/2023 As per Surgery Dr. Miya Pandyaeper is a 70y.o. 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  Cooper underwent screening mammography at an outside facility. There is an area of architectural distortion in the upper aspect of the left breast about 1.2 cm in diameter she underwent diagnostic imaging as well as an ultrasound. She was referred to [facility]) for biopsy. She denied previous breast biopsies. There were no abnormal appearing lymph nodes in the axilla on ultrasound. She underwent an ultrasound-guided biopsyOn September 7, 2023. A barbell clip was placed at the biopsy site in the upper inner aspect of the left breast.  Pathology revealed a low-grade invasive ductal carcinoma with associated DCIS. It was [place] score 1. Estrogen receptors +100%, progesterone receptor +100% Ki-67 was 5% and HER2 IHC was negative.   Patient denies any

## 2023-10-19 ENCOUNTER — TELEPHONE (OUTPATIENT)
Dept: SPIRITUAL SERVICES | Facility: CLINIC | Age: 63
End: 2023-10-19

## 2023-10-19 ENCOUNTER — CLINICAL DOCUMENTATION (OUTPATIENT)
Dept: SPIRITUAL SERVICES | Facility: CLINIC | Age: 63
End: 2023-10-19

## 2023-10-19 NOTE — PROGRESS NOTES
As a result of a referral being made by the cancer center and the nurse navigator, this  attempted to call Anabella Vázquez and left a message on her answering machine. A letter will be sent today with our contact information on it, as this  is resigning from spiritual care at this time.

## 2023-10-19 NOTE — TELEPHONE ENCOUNTER
As a follow up to a referral made by the Breast cancer, nurse navigator for spiritual care, this  attempted to call Cooper.  The call did not go through. A letter with our contact information will be sent.

## 2023-10-24 ENCOUNTER — HOSPITAL ENCOUNTER (OUTPATIENT)
Dept: INFUSION THERAPY | Age: 63
Discharge: HOME | End: 2023-10-24
Payer: MEDICARE

## 2023-10-24 ENCOUNTER — OFFICE VISIT (OUTPATIENT)
Dept: ONCOLOGY | Age: 63
End: 2023-10-24
Payer: MEDICARE

## 2023-10-24 ENCOUNTER — CLINICAL DOCUMENTATION (OUTPATIENT)
Dept: CASE MANAGEMENT | Age: 63
End: 2023-10-24

## 2023-10-24 VITALS
RESPIRATION RATE: 16 BRPM | TEMPERATURE: 97.8 F | DIASTOLIC BLOOD PRESSURE: 69 MMHG | SYSTOLIC BLOOD PRESSURE: 134 MMHG | OXYGEN SATURATION: 97 % | HEART RATE: 87 BPM

## 2023-10-24 VITALS
TEMPERATURE: 97.8 F | OXYGEN SATURATION: 97 % | HEIGHT: 60 IN | SYSTOLIC BLOOD PRESSURE: 134 MMHG | HEART RATE: 87 BPM | RESPIRATION RATE: 16 BRPM | DIASTOLIC BLOOD PRESSURE: 69 MMHG | WEIGHT: 156.8 LBS | BODY MASS INDEX: 30.78 KG/M2

## 2023-10-24 DIAGNOSIS — Z17.0 CARCINOMA OF UPPER-INNER QUADRANT OF LEFT BREAST IN FEMALE, ESTROGEN RECEPTOR POSITIVE (HCC): Primary | ICD-10-CM

## 2023-10-24 DIAGNOSIS — C50.212 CARCINOMA OF UPPER-INNER QUADRANT OF LEFT BREAST IN FEMALE, ESTROGEN RECEPTOR POSITIVE (HCC): Primary | ICD-10-CM

## 2023-10-24 PROCEDURE — 99203 OFFICE O/P NEW LOW 30 MIN: CPT | Performed by: INTERNAL MEDICINE

## 2023-10-24 PROCEDURE — 1123F ACP DISCUSS/DSCN MKR DOCD: CPT | Performed by: INTERNAL MEDICINE

## 2023-10-24 PROCEDURE — 99211 OFF/OP EST MAY X REQ PHY/QHP: CPT

## 2023-10-24 RX ORDER — LETROZOLE 2.5 MG/1
2.5 TABLET, FILM COATED ORAL DAILY
Qty: 90 TABLET | Refills: 0 | Status: SHIPPED | OUTPATIENT
Start: 2023-10-24 | End: 2024-01-22

## 2023-10-24 NOTE — PROGRESS NOTES
Name: Jorge L Tate  : 1960  MRN: [id number]    Oncology Navigation Follow-Up Note    Contact Type:  Medical Oncology    Subjective: Patient seen in conjunction with Dr. Rajendra Solano.  Patient arrived alone. States she can do everything she could do pre-op. Objective: Left breast lumpectomy 23 per Dr. José Arias, node negative, OncoDx recurrence score 12. Chemotherapy not indicated. Radiation Sim scheduled for 10/30/23. Assistance Needed: None     Receptive to Advanced Care Planning / Palliative Care:  NA    Referrals: no new referrals today    Education: Treatment plan discussed by Dr. Rajendra Solano in detail. Questions addressed. Discussed results of DEXA scan. Currently taking Calcium with Vit D. Discussed bisphosphonate therapy for osteoporosis. Dental clearance for provided. Notes: Will follow through survivorship.     Electronically signed by Syed Chapin RN on 10/24/2023 at 2:49 PM

## 2023-10-24 NOTE — PATIENT INSTRUCTIONS
Please do not start the medication till it is okay by radiation oncology  Follow up in 1 month after finishing radiation

## 2023-10-24 NOTE — PROGRESS NOTES
variants were detected in the 81 genes analyzed. MOLECULAR:      ASSESSMENT/PLAN:      1) Oncology Diagnosis:   Postmenopausal Left breast, invasive ductal carcinoma:   S/p partial mastectomy Dr. Ghulam Beach 9/27/2023   Pathologic stage: pT1c, pN0 (sn). ER +, GA+, Her2 negative. Ki 67 :5%  Oncotype : 12   Empower negative. - Discussed diagnosis and treatment with Mrs. Malika Gamino, discussed no expected benefit from adding chemotherapy, explained treatment goal of decreasing the chance of recurrence locally and systemically. - she is seeing rad/onc for radiation.     - explained aromatase inhibitors and possible side effects including but not limited to arthralagias,decreasing bone density, hot flashes and fatigue and possible treatments for these side effects if they happen. - start Letrozole when okay by radiation oncology    - follow up in 1 month after starting.     - screening mammograms to start after that. ## bone health:   Has osteoprosis on recent DEXA  Discussed ca/vit D and possible zometra or denosumab which she will think about  Dental clearnace if she decides to start it       Patient was given opportunity to ask questions and answered these questions. Follow Up:  Return in about 2 months (around 12/24/2023).      Nico Novak MD, MSc  Hematology oncology

## 2023-10-26 ENCOUNTER — CLINICAL DOCUMENTATION (OUTPATIENT)
Facility: HOSPITAL | Age: 63
End: 2023-10-26

## 2023-10-30 ENCOUNTER — HOSPITAL ENCOUNTER (OUTPATIENT)
Dept: CT IMAGING | Age: 63
Discharge: HOME | End: 2023-10-30
Payer: MEDICARE

## 2023-10-30 ENCOUNTER — APPOINTMENT (OUTPATIENT)
Dept: RADIATION ONCOLOGY | Age: 63
End: 2023-10-30
Payer: MEDICARE

## 2023-10-30 DIAGNOSIS — Z17.0 MALIGNANT NEOPLASM OF UPPER-INNER QUADRANT OF LEFT BREAST IN FEMALE, ESTROGEN RECEPTOR POSITIVE (HCC): ICD-10-CM

## 2023-10-30 DIAGNOSIS — C50.212 MALIGNANT NEOPLASM OF UPPER-INNER QUADRANT OF LEFT BREAST IN FEMALE, ESTROGEN RECEPTOR POSITIVE (HCC): ICD-10-CM

## 2023-10-30 PROCEDURE — 77334 RADIATION TREATMENT AID(S): CPT | Performed by: RADIOLOGY

## 2023-10-30 PROCEDURE — 77263 THER RADIOLOGY TX PLNG CPLX: CPT | Performed by: RADIOLOGY

## 2023-10-30 PROCEDURE — 3209999900 CT GUIDE RADIATION THERAPY NO CHARGE

## 2023-11-07 ENCOUNTER — HOSPITAL ENCOUNTER (OUTPATIENT)
Dept: RADIATION ONCOLOGY | Age: 63
End: 2023-11-07
Payer: MEDICARE

## 2023-11-07 PROCEDURE — 77301 RADIOTHERAPY DOSE PLAN IMRT: CPT | Performed by: RADIOLOGY

## 2023-11-07 PROCEDURE — 77338 DESIGN MLC DEVICE FOR IMRT: CPT | Performed by: RADIOLOGY

## 2023-11-07 PROCEDURE — 77300 RADIATION THERAPY DOSE PLAN: CPT | Performed by: RADIOLOGY

## 2023-11-08 ENCOUNTER — HOSPITAL ENCOUNTER (OUTPATIENT)
Dept: RADIATION ONCOLOGY | Age: 63
Discharge: HOME | End: 2023-11-08
Payer: MEDICARE

## 2023-11-08 PROCEDURE — 77385 HC NTSTY MODUL RAD TX DLVR SMPL: CPT | Performed by: RADIOLOGY

## 2023-11-08 NOTE — DISCHARGE INSTRUCTIONS
PATIENT DISCHARGE INSTRUCTIONS    Remember that side effects present at the end of your treatments will improve within a few weeks after the last treatment. Eat well balanced meals even though your treatments are finished. This will help speed the healing process. Continue any special diets prescribed to control side effects until these side effects have been resolved. Get plenty of rest.  If you have experienced fatigue and/or weakness, this may continue for several weeks after your last treatment. Continue with your daily activities according to the way you feel. Continue to be gentle with your skin. Follow your present skin care instructions until your follow-up visit. IF YOU DEVELOP ANY CHANGES IN YOUR SKIN IN THE AREA TREATED WITH RADIATION, PLEASE CALL THE RADIATION ONCOLOGY NURSE -[phone]. Protect your skin from any injury and avoid direct sun exposure in the treatment area. The skin in the treated area may always be more sensitive than the rest of your skin. Always use SPF 27 or higher sun block if you will be in the sun and cannot avoid exposure. Please contact your referring physician for a follow-up appointment in addition to your Radiation Oncology appointment. You may receive a survey via text message or e-mail at some point after treatment. We would appreciate your time in filling out this survey and giving us your honest feedback about your experience with us. We strive for excellence and hope that you were \"Very Satisfied\" with your care and our service. Presence of pain:   Medication Taper: No    See Instructions Dated: N/A  Follow up orders:  Will be discussed at Follow-Up

## 2023-11-10 ENCOUNTER — HOSPITAL ENCOUNTER (OUTPATIENT)
Dept: RADIATION ONCOLOGY | Age: 63
Discharge: HOME | End: 2023-11-10
Payer: MEDICARE

## 2023-11-10 PROCEDURE — 77385 HC NTSTY MODUL RAD TX DLVR SMPL: CPT | Performed by: RADIOLOGY

## 2023-11-13 ENCOUNTER — HOSPITAL ENCOUNTER (OUTPATIENT)
Dept: RADIATION ONCOLOGY | Age: 63
Discharge: HOME | End: 2023-11-13
Payer: MEDICARE

## 2023-11-13 PROCEDURE — 77385 HC NTSTY MODUL RAD TX DLVR SMPL: CPT | Performed by: RADIOLOGY

## 2023-11-13 PROCEDURE — 77014 CHG CT GUIDANCE RADIATION THERAPY FLDS PLACEMENT: CPT | Performed by: RADIOLOGY

## 2023-11-13 PROCEDURE — 77336 RADIATION PHYSICS CONSULT: CPT | Performed by: RADIOLOGY

## 2023-11-15 ENCOUNTER — HOSPITAL ENCOUNTER (OUTPATIENT)
Dept: RADIATION ONCOLOGY | Age: 63
Discharge: HOME | End: 2023-11-15
Payer: MEDICARE

## 2023-11-15 PROCEDURE — 77385 HC NTSTY MODUL RAD TX DLVR SMPL: CPT | Performed by: RADIOLOGY

## 2023-11-17 ENCOUNTER — HOSPITAL ENCOUNTER (OUTPATIENT)
Dept: RADIATION ONCOLOGY | Age: 63
End: 2023-11-17
Payer: MEDICARE

## 2023-11-17 ENCOUNTER — HOSPITAL ENCOUNTER (OUTPATIENT)
Dept: RADIATION ONCOLOGY | Age: 63
Discharge: HOME | End: 2023-11-17
Payer: MEDICARE

## 2023-11-17 VITALS
OXYGEN SATURATION: 95 % | HEART RATE: 78 BPM | BODY MASS INDEX: 30.66 KG/M2 | TEMPERATURE: 98.3 F | RESPIRATION RATE: 16 BRPM | SYSTOLIC BLOOD PRESSURE: 126 MMHG | WEIGHT: 157 LBS | DIASTOLIC BLOOD PRESSURE: 67 MMHG

## 2023-11-17 PROCEDURE — 77385 HC NTSTY MODUL RAD TX DLVR SMPL: CPT | Performed by: RADIOLOGY

## 2024-01-02 ENCOUNTER — HOSPITAL ENCOUNTER (OUTPATIENT)
Dept: RADIATION ONCOLOGY | Age: 64
Discharge: HOME | End: 2024-01-02
Payer: MEDICARE

## 2024-01-02 VITALS
DIASTOLIC BLOOD PRESSURE: 78 MMHG | OXYGEN SATURATION: 95 % | RESPIRATION RATE: 16 BRPM | HEART RATE: 116 BPM | WEIGHT: 157 LBS | TEMPERATURE: 97.9 F | BODY MASS INDEX: 30.66 KG/M2 | SYSTOLIC BLOOD PRESSURE: 142 MMHG

## 2024-01-02 PROCEDURE — 99212 OFFICE O/P EST SF 10 MIN: CPT

## 2024-01-02 ASSESSMENT — ENCOUNTER SYMPTOMS
DIARRHEA: 0
COUGH: 0
ABDOMINAL PAIN: 0
BACK PAIN: 0
SHORTNESS OF BREATH: 0
RECTAL PAIN: 0
NAUSEA: 0
TROUBLE SWALLOWING: 0
BLOOD IN STOOL: 0

## 2024-01-02 NOTE — PROGRESS NOTES
Receptor (ER) Status    Positive (greater than 10% of cells demonstrate nuclear positivity)      Percentage of Cells with Nuclear Positivity      Specify %: 100%    Progesterone Receptor (PgR) Status    Positive      Percentage of Cells with Nuclear Positivity#      Specify %: 100%    HER2 (by immunohistochemistry)    Negative (Score 0)    Ki-67 Percentage of Positive Nuclei: 5%    Testing Performed on Case Number: [id number]     [id number] x 2                                                       <Sign Out Dr. Vasques>                                               GODFREY BOOKER D.O., F.C.A.PYvette      10/5/2023 -  Cancer Staged    Staging form: Breast, AJCC 8th Edition  - Pathologic stage from 10/5/2023: Stage IA (pT1c, pN0(sn), cM0, G1, ER+, MA+, HER2-)     10/9/2023 Genetic Testing         11/8/2023 - 11/17/2023 Radiation    Treatment Course Number: 1    Treatment Site (s) Modality Dose (cGy) Fractions Elapsed Days   Left breast APBI IMRT 3000 5 9   Cumulative Dose: 3000 cGy    Radiation therapy delivered under the care of Dr. Gino Sprague MD MS ([facility])          INTERVAL HISTORY:  Cooper Figueredo is a 63 y.o. female is presenting today for regularly scheduled follow up regarding the above mentioned oncologic history.    Review of Systems   Constitutional:  Negative for activity change, appetite change, fatigue and unexpected weight change.   HENT:  Negative for ear pain and trouble swallowing.    Respiratory:  Negative for cough and shortness of breath.    Cardiovascular:  Negative for chest pain and leg swelling.   Gastrointestinal:  Negative for abdominal pain, blood in stool, diarrhea, nausea and rectal pain.   Genitourinary:  Negative for dysuria, frequency, hematuria and urgency.   Musculoskeletal:  Negative for arthralgias, back pain and myalgias.   Skin:  Negative for rash and wound.   Neurological:  Negative for dizziness, weakness, light-headedness, numbness and headaches.   Psychiatric/Behavioral:  Negative

## 2024-01-22 RX ORDER — LETROZOLE 2.5 MG/1
2.5 TABLET, FILM COATED ORAL DAILY
Qty: 90 TABLET | Refills: 0 | Status: SHIPPED | OUTPATIENT
Start: 2024-01-22

## 2024-02-14 DIAGNOSIS — C50.212 CARCINOMA OF UPPER-INNER QUADRANT OF LEFT BREAST IN FEMALE, ESTROGEN RECEPTOR POSITIVE (HCC): Primary | ICD-10-CM

## 2024-02-14 DIAGNOSIS — Z17.0 CARCINOMA OF UPPER-INNER QUADRANT OF LEFT BREAST IN FEMALE, ESTROGEN RECEPTOR POSITIVE (HCC): Primary | ICD-10-CM

## 2024-02-15 ENCOUNTER — OFFICE VISIT (OUTPATIENT)
Dept: ONCOLOGY | Age: 64
End: 2024-02-15
Payer: MEDICARE

## 2024-02-15 ENCOUNTER — HOSPITAL ENCOUNTER (OUTPATIENT)
Dept: INFUSION THERAPY | Age: 64
Discharge: HOME | End: 2024-02-15
Payer: MEDICARE

## 2024-02-15 VITALS
HEART RATE: 98 BPM | RESPIRATION RATE: 18 BRPM | HEIGHT: 60 IN | DIASTOLIC BLOOD PRESSURE: 72 MMHG | TEMPERATURE: 98.1 F | SYSTOLIC BLOOD PRESSURE: 138 MMHG | OXYGEN SATURATION: 98 % | BODY MASS INDEX: 31.02 KG/M2 | WEIGHT: 158 LBS

## 2024-02-15 VITALS
SYSTOLIC BLOOD PRESSURE: 138 MMHG | TEMPERATURE: 98.1 F | OXYGEN SATURATION: 98 % | DIASTOLIC BLOOD PRESSURE: 72 MMHG | RESPIRATION RATE: 18 BRPM | HEART RATE: 98 BPM

## 2024-02-15 DIAGNOSIS — C50.212 CARCINOMA OF UPPER-INNER QUADRANT OF LEFT BREAST IN FEMALE, ESTROGEN RECEPTOR POSITIVE (HCC): ICD-10-CM

## 2024-02-15 DIAGNOSIS — M81.0 AGE-RELATED OSTEOPOROSIS WITHOUT CURRENT PATHOLOGICAL FRACTURE: Primary | ICD-10-CM

## 2024-02-15 DIAGNOSIS — Z17.0 CARCINOMA OF UPPER-INNER QUADRANT OF LEFT BREAST IN FEMALE, ESTROGEN RECEPTOR POSITIVE (HCC): ICD-10-CM

## 2024-02-15 LAB
ABSOLUTE IMMATURE GRANULOCYTE: 0.02 THOU/MM3 (ref 0–0.07)
ALBUMIN SERPL BCG-MCNC: 4.1 G/DL (ref 3.5–5.1)
ALP SERPL-CCNC: 76 U/L (ref 38–126)
ALT SERPL W/O P-5'-P-CCNC: 15 U/L (ref 11–66)
AST SERPL-CCNC: 14 U/L (ref 5–40)
BASOPHILS ABSOLUTE: 0 THOU/MM3 (ref 0–0.1)
BASOPHILS NFR BLD AUTO: 1 % (ref 0–3)
BILIRUB CONJ SERPL-MCNC: < 0.2 MG/DL (ref 0–0.3)
BILIRUB SERPL-MCNC: 0.3 MG/DL (ref 0.3–1.2)
BUN BLDP-MCNC: 15 MG/DL (ref 8–26)
CHLORIDE BLD-SCNC: 108 MEQ/L (ref 98–109)
CREAT BLD-MCNC: 0.8 MG/DL (ref 0.5–1.2)
EOSINOPHIL NFR BLD AUTO: 4 % (ref 0–4)
EOSINOPHILS ABSOLUTE: 0.3 THOU/MM3 (ref 0–0.4)
ERYTHROCYTE [DISTWIDTH] IN BLOOD BY AUTOMATED COUNT: 13 % (ref 11.5–14.5)
GFR SERPL CREATININE-BSD FRML MDRD: > 60 ML/MIN/1.73M2
GLUCOSE BLD-MCNC: 94 MG/DL (ref 70–108)
HCT VFR BLD AUTO: 39.8 % (ref 37–47)
HGB BLD-MCNC: 13.2 GM/DL (ref 12–16)
IMMATURE GRANULOCYTES: 0 %
IONIZED CALCIUM, WHOLE BLOOD: 1.17 MMOL/L (ref 1.12–1.32)
LYMPHOCYTES ABSOLUTE: 1.5 THOU/MM3 (ref 1–4.8)
LYMPHOCYTES NFR BLD AUTO: 21 % (ref 15–47)
MCH RBC QN AUTO: 29.7 PG (ref 26–33)
MCHC RBC AUTO-ENTMCNC: 33.2 GM/DL (ref 32.2–35.5)
MCV RBC AUTO: 89 FL (ref 81–99)
MONOCYTES ABSOLUTE: 0.6 THOU/MM3 (ref 0.4–1.3)
MONOCYTES NFR BLD AUTO: 9 % (ref 0–12)
NEUTROPHILS NFR BLD AUTO: 66 % (ref 43–75)
PLATELET # BLD AUTO: 254 THOU/MM3 (ref 130–400)
PMV BLD AUTO: 8.4 FL (ref 9.4–12.4)
POTASSIUM BLD-SCNC: 4 MEQ/L (ref 3.5–4.9)
PROT SERPL-MCNC: 6.8 G/DL (ref 6.1–8)
RBC # BLD AUTO: 4.45 MILL/MM3 (ref 4.2–5.4)
SEGMENTED NEUTROPHILS ABSOLUTE COUNT: 4.7 THOU/MM3 (ref 1.8–7.7)
SODIUM BLD-SCNC: 144 MEQ/L (ref 138–146)
TOTAL CO2, WHOLE BLOOD: 26 MEQ/L (ref 23–33)
WBC # BLD AUTO: 7.1 THOU/MM3 (ref 4.8–10.8)

## 2024-02-15 PROCEDURE — 99211 OFF/OP EST MAY X REQ PHY/QHP: CPT

## 2024-02-15 PROCEDURE — 80076 HEPATIC FUNCTION PANEL: CPT

## 2024-02-15 PROCEDURE — 99214 OFFICE O/P EST MOD 30 MIN: CPT | Performed by: INTERNAL MEDICINE

## 2024-02-15 PROCEDURE — 80047 BASIC METABLC PNL IONIZED CA: CPT

## 2024-02-15 PROCEDURE — 36415 COLL VENOUS BLD VENIPUNCTURE: CPT

## 2024-02-15 PROCEDURE — 1123F ACP DISCUSS/DSCN MKR DOCD: CPT | Performed by: INTERNAL MEDICINE

## 2024-02-15 PROCEDURE — 85025 COMPLETE CBC W/AUTO DIFF WBC: CPT

## 2024-02-15 RX ORDER — SODIUM CHLORIDE 9 MG/ML
5-250 INJECTION, SOLUTION INTRAVENOUS PRN
OUTPATIENT
Start: 2024-02-29

## 2024-02-15 RX ORDER — DIPHENHYDRAMINE HYDROCHLORIDE 50 MG/ML
50 INJECTION INTRAMUSCULAR; INTRAVENOUS
OUTPATIENT
Start: 2024-02-29

## 2024-02-15 RX ORDER — HEPARIN SODIUM (PORCINE) LOCK FLUSH IV SOLN 100 UNIT/ML 100 UNIT/ML
500 SOLUTION INTRAVENOUS PRN
OUTPATIENT
Start: 2024-02-29

## 2024-02-15 RX ORDER — ACETAMINOPHEN 325 MG/1
650 TABLET ORAL
OUTPATIENT
Start: 2024-02-29

## 2024-02-15 RX ORDER — FAMOTIDINE 10 MG/ML
20 INJECTION, SOLUTION INTRAVENOUS
OUTPATIENT
Start: 2024-02-29

## 2024-02-15 RX ORDER — SODIUM CHLORIDE 9 MG/ML
INJECTION, SOLUTION INTRAVENOUS CONTINUOUS
OUTPATIENT
Start: 2024-02-29

## 2024-02-15 RX ORDER — ONDANSETRON 2 MG/ML
8 INJECTION INTRAMUSCULAR; INTRAVENOUS
OUTPATIENT
Start: 2024-02-29

## 2024-02-15 RX ORDER — EPINEPHRINE 1 MG/ML
0.3 INJECTION, SOLUTION, CONCENTRATE INTRAVENOUS PRN
OUTPATIENT
Start: 2024-02-29

## 2024-02-15 RX ORDER — MELOXICAM 7.5 MG/1
7.5 TABLET ORAL DAILY
COMMUNITY
Start: 2024-02-07

## 2024-02-15 RX ORDER — ZOLEDRONIC ACID 5 MG/100ML
5 INJECTION, SOLUTION INTRAVENOUS ONCE
OUTPATIENT
Start: 2024-02-29 | End: 2024-02-29

## 2024-02-15 RX ORDER — ALBUTEROL SULFATE 90 UG/1
4 AEROSOL, METERED RESPIRATORY (INHALATION) PRN
OUTPATIENT
Start: 2024-02-29

## 2024-02-15 RX ORDER — SODIUM CHLORIDE 0.9 % (FLUSH) 0.9 %
5-40 SYRINGE (ML) INJECTION PRN
OUTPATIENT
Start: 2024-02-29

## 2024-02-15 NOTE — PATIENT INSTRUCTIONS
Authorization for reclast requested.  Pt has dental clearance form reviewed, multiple non restorable teeth will need extraction. Pt should complete her planned extraction and clearance should be obtained before proceeding with bisphosphonate therapy.  Plan to initiate therapy with bisphosphanate after clearance is obtained.  Pt will start  calcium / vitamin D soft chews.  Labs reviewed.  No orders of the defined types were placed in this encounter.  Return in about 3 months (around 5/15/2024).

## 2024-02-23 ENCOUNTER — TELEPHONE (OUTPATIENT)
Dept: CASE MANAGEMENT | Age: 64
End: 2024-02-23

## 2024-02-23 NOTE — TELEPHONE ENCOUNTER
Name: Cooper Figueredo  : 1960  MRN: [id number]    Oncology Navigation Follow-Up Note    Contact Type:  Telephone    Notes: Called Cooper to discuss dental clearance needed prior to initiating Zometa. Her dentis has denied dental clearance due to needing to extract teeth (6). Cooper states she cannot afford the $700. That it will cost for extraction. Kassie Garcia notified for possible assistance. Cooper knows appointment for 3/1/24 will be cancelled and can be rescheduled following dental clearance.     Electronically signed by Ambreen Thomson RN on 2024 at 1:01 PM

## 2024-02-23 NOTE — PROGRESS NOTES
History:  Past Surgical History:   Procedure Laterality Date    BREAST LUMPECTOMY Left 2023    Left Breast Lumpectomy, SLN Biopsy, Preop Needle LOC performed by Peri Iraheta MD at [facility] OR     SECTION      x2    CHOLECYSTECTOMY      COLONOSCOPY      EYE SURGERY      JOVON US GUID NDL BIOPSY LEFT Left 2023    inv ductal    MRI BREAST BIOPSY LOCALIZATION CLIP PLACEMENT LEFT Left 2023    MRI BREAST BIOPSY LOCALIZATION CLIP PLACEMENT LEFT 2023 [facility] MRI    TONSILLECTOMY      US GUIDED NEEDLE LOC OF LEFT BREAST Left 2023    US GUIDED NEEDLE LOC OF LEFT BREAST 2023 Mariela Flores MD [facility]      Fam HX:   Family History   Problem Relation Age of Onset    Heart Failure Mother     Alzheimer's Disease Mother     Lung Cancer Father     Breast Cancer Sister 58        metastatic to bone    Breast Cancer Paternal Aunt     Breast Cancer Paternal Aunt     Breast Cancer Paternal Aunt     Ovarian Cancer Maternal Grandmother         age unknown      Social HX:   Social History     Socioeconomic History    Marital status:      Spouse name: Asaf    Number of children: 1    Years of education: Not on file    Highest education level: Not on file   Occupational History    Not on file   Tobacco Use    Smoking status: Former     Current packs/day: 0.00     Average packs/day: 2.0 packs/day for 35.0 years (70.0 ttl pk-yrs)     Types: Cigarettes     Start date: 1978     Quit date: 2013     Years since quittin.1    Smokeless tobacco: Never   Vaping Use    Vaping Use: Never used   Substance and Sexual Activity    Alcohol use: Never    Drug use: Never    Sexual activity: Not on file   Other Topics Concern    Not on file   Social History Narrative    Not on file     Social Determinants of Health     Financial Resource Strain: Not on file   Food Insecurity: Not on file   Transportation Needs: Not on file   Physical Activity: Not on file   Stress: Not on file   Social

## 2024-03-01 ENCOUNTER — HOSPITAL ENCOUNTER (OUTPATIENT)
Dept: INFUSION THERAPY | Age: 64
End: 2024-03-01

## 2024-04-11 ENCOUNTER — OFFICE VISIT (OUTPATIENT)
Dept: SURGERY | Age: 64
End: 2024-04-11
Payer: MEDICARE

## 2024-04-11 VITALS
SYSTOLIC BLOOD PRESSURE: 132 MMHG | RESPIRATION RATE: 16 BRPM | HEIGHT: 60 IN | DIASTOLIC BLOOD PRESSURE: 64 MMHG | BODY MASS INDEX: 31.69 KG/M2 | HEART RATE: 107 BPM | TEMPERATURE: 97.4 F | WEIGHT: 161.4 LBS | OXYGEN SATURATION: 98 %

## 2024-04-11 DIAGNOSIS — Z17.0 CARCINOMA OF UPPER-INNER QUADRANT OF LEFT BREAST IN FEMALE, ESTROGEN RECEPTOR POSITIVE (HCC): Primary | ICD-10-CM

## 2024-04-11 DIAGNOSIS — C50.212 CARCINOMA OF UPPER-INNER QUADRANT OF LEFT BREAST IN FEMALE, ESTROGEN RECEPTOR POSITIVE (HCC): Primary | ICD-10-CM

## 2024-04-11 DIAGNOSIS — M81.0 OSTEOPOROSIS WITHOUT CURRENT PATHOLOGICAL FRACTURE, UNSPECIFIED OSTEOPOROSIS TYPE: ICD-10-CM

## 2024-04-11 PROCEDURE — 99214 OFFICE O/P EST MOD 30 MIN: CPT | Performed by: SURGERY

## 2024-04-11 PROCEDURE — 1123F ACP DISCUSS/DSCN MKR DOCD: CPT | Performed by: SURGERY

## 2024-04-11 ASSESSMENT — ENCOUNTER SYMPTOMS
COUGH: 0
SHORTNESS OF BREATH: 0
NAUSEA: 0
BLOOD IN STOOL: 0
VOMITING: 0
COLOR CHANGE: 0
ABDOMINAL PAIN: 0
WHEEZING: 0
SORE THROAT: 0

## 2024-04-11 NOTE — PROGRESS NOTES
Peri Iraheta MD   General Surgery  New Patient Evaluation in Office  Pt Name: Cooper Figueredo  Date of Birth 1960   Today's Date: 4/11/2024  Medical Record Number: [id number]  Referring Provider: Dr. Cuenca  Primary Care Provider: Geovany Cuenca II, DO  Chief Complaint:  Chief Complaint   Patient presents with    6 Month Follow-Up     Carcinoma of upper-inner quadrant of left breast in female, estrogen receptor positive-Last seen 10/05/23-Follows Dr. Ordoñez/Dr. Sprague-On Femara-Mammo done 9/06/23       ASSESSMENT      1. Carcinoma of upper-inner quadrant of left breast in female, estrogen receptor positive (HCC)    2. Osteoporosis without current pathological fracture, unspecified osteoporosis type      Pathologic stage Ia ER positive [place] positive HER2 negative, Oncotype DX score 12.  PLANS      Genetic 81 gene panel negative for deleterious variant  2.  Medical oncology records reviewed.  Plan to start biphosphonate therapy.  Dental clearance, pending.    3 . completed course of radiation therapy.  Total dose 3000 cGy.  5 fractions.    4.  Clinical breast examination benign, today.  5.  Patient does not feel she can tolerate breast MRI   6.  Continue AI.  Patient denies any side effects.  7.  Survivorship plan reviewed.  Recent Cologuard negative  8.    Patient may consider ABUS in the future for dense breast if she cannot absolutely tolerate breast MRI  SUBJECTIVE     Cooper is a 63 y.o. year old female who is presenting today in the office for follow-up evaluation of  invasive ductal carcinoma of the upper inner quadrant of the left breast.  Cooper underwent screening mammography at an outside facility.  There is an area of architectural distortion in the upper aspect of the left breast about 1.2 cm in diameter she underwent diagnostic imaging as well as an ultrasound.  She was referred to [facility] for biopsy.  She denied previous breast biopsies.  There were no abnormal appearing lymph nodes in the axilla on

## 2024-05-16 ENCOUNTER — OFFICE VISIT (OUTPATIENT)
Dept: ONCOLOGY | Age: 64
End: 2024-05-16
Payer: MEDICARE

## 2024-05-16 ENCOUNTER — HOSPITAL ENCOUNTER (OUTPATIENT)
Dept: INFUSION THERAPY | Age: 64
Discharge: HOME | End: 2024-05-16
Payer: MEDICARE

## 2024-05-16 VITALS
BODY MASS INDEX: 31.33 KG/M2 | HEIGHT: 60 IN | DIASTOLIC BLOOD PRESSURE: 74 MMHG | OXYGEN SATURATION: 98 % | SYSTOLIC BLOOD PRESSURE: 152 MMHG | WEIGHT: 159.6 LBS | RESPIRATION RATE: 16 BRPM | HEART RATE: 80 BPM

## 2024-05-16 VITALS
HEART RATE: 80 BPM | DIASTOLIC BLOOD PRESSURE: 74 MMHG | SYSTOLIC BLOOD PRESSURE: 152 MMHG | RESPIRATION RATE: 16 BRPM | TEMPERATURE: 98.1 F

## 2024-05-16 DIAGNOSIS — C50.212 CARCINOMA OF UPPER-INNER QUADRANT OF LEFT BREAST IN FEMALE, ESTROGEN RECEPTOR POSITIVE (HCC): Primary | ICD-10-CM

## 2024-05-16 DIAGNOSIS — Z17.0 CARCINOMA OF UPPER-INNER QUADRANT OF LEFT BREAST IN FEMALE, ESTROGEN RECEPTOR POSITIVE (HCC): Primary | ICD-10-CM

## 2024-05-16 PROCEDURE — 1123F ACP DISCUSS/DSCN MKR DOCD: CPT | Performed by: INTERNAL MEDICINE

## 2024-05-16 PROCEDURE — 99211 OFF/OP EST MAY X REQ PHY/QHP: CPT

## 2024-05-16 PROCEDURE — 99214 OFFICE O/P EST MOD 30 MIN: CPT | Performed by: INTERNAL MEDICINE

## 2024-05-16 NOTE — PATIENT INSTRUCTIONS
Return in about 4 months (around 9/16/2024).MD visit(  please make a survivorship visit with Ms. Mosquera); no labs

## 2024-05-16 NOTE — PROGRESS NOTES
lesion on mammography. Post placement mammograms were taken in a separate room and marked. The most posterior/medial wire passes through the mass and adjacent to the biopsy clip. This is the wire through the biopsy-proven malignancy. The MRI placed wire enters from lateral. The 2nd ultrasound placed wire enters from medial. The MR lesion of interest is between these 2 wires. These findings were discussed with Dr. Iraheta prior to the patient arriving in surgery. SPECIMEN RADIOGRAPH: The surgical specimen contains the localization wires, the barbell biopsy clip, the mass associated with a barbell biopsy clip and a small density between the other 2 wires.. The mass with a barbell clip is along the cranial margin.These results were telephoned to the operating room by Dr. Flores discussed with Dr Iraheta.     Result Date: 9/27/2023  1. Successful left breast needle wire localization, one wire was placed and MRI, 2 wires were placed and ultrasound. 2. Post mammographic images demonstrates the wires in the appropriate position. 3. Specimen radiograph demonstrates the 3 wires, barbell biopsy clip , the mass and the MRI region of interest within the specimen. BI-RADS Final Assessment Category: Not applicable. **This report has been created using voice recognition software. It may contain minor errors which are inherent in voice recognition technology.** Final report electronically signed by Dr. Mariela Flores on 9/27/2023 2:01 PM     US PLACE BREAST LOC DEVICE 1ST LESION LEFT    Addendum Date: 9/27/2023    *LIMA PROCEDURE: MRI BREAST LOC DEVICE PLACEMENT LEFT 1ST LESION, MAMMOGRAM POST BX CLIP PLACEMENT LEFT, US PLACE BREAST LOC DEVICE 1ST LESION LEFT, JOVON BREAST SPECIMEN CLINICAL INFORMATION: Malignant neoplasm of upper-inner quadrant of left breast in female, estrogen receptor positive (HCC), Malignant neoplasm of upper-inner quadrant of left breast in female, estrogen receptor positive (HCC) . COMPARISON: There are no prior exams

## 2024-06-13 NOTE — DISCHARGE INSTRUCTIONS
DR MIRELES'S DISCHARGE INSTRUCTIONS    Pt Name: Dolores Inman Record Number: [id number]  Today's Date: 9/27/2023    GENERAL ANESTHESIA OR SEDATION  1. Do not drive or operate hazardous machinery for 24 hours. 2. Do not make important business or personal decisions for 24 hours. 3. Do not drink alcoholic beverages or use tobacco for 24 hours. ACTIVITY INSTRUCTIONS:  [] Rest today. Resume light to normal activity tomorrow. [x] You may resume normal activity tomorrow. Do not engage in strenuous activity that may place stress on your incision. [] Do not drive for 3-5 days and avoid heavy lifting, tugging, pullings greater than 10-20 lbs until seen in the office. DIET INSTRUCTIONS:  []Begin with clear liquids. If not nauseated, may increase to a low-fat diet when you desire. Greasy and spicy foods are not advised. [x]Regular diet as tolerated. []Other:     MEDICATIONS  [x]Prescription sent with you to be used as directed. []Lortab   [x]Tramadol   []Percocet   []Tylenol #3   []Oxycontin   Do not drink alcohol or drive while taking these medications. You may experience dizziness or drowsiness with these medications. You may also experience constipation which can be relieved with stool softners or laxatives. [x]You may resume your daily prescription medication schedule unless otherwise specified. [x]Do not take 325mg Aspirin or other blood thinners such as Coumadin or Plavix for 5 days. WOUND/DRESSING INSTRUCTIONS:  Always ensure you and your care giver clean hands before and after caring for the wound. [] Keep dressing clean and dry for 48 hours. Change when soiled or wet. [] Allow steri-strips to fall off on their own.   [] Ice operative site for 20 minutes 4 times a day. [x] May wash over incision in shower in 48 hours, but do not soak in a bath.  [] Take sitz bath for 20 minutes twice daily and after bowel movements. [] Keep the abdominal binder in place during the day.  May remove to English

## 2024-06-25 RX ORDER — LETROZOLE 2.5 MG/1
2.5 TABLET, FILM COATED ORAL DAILY
Qty: 90 TABLET | Refills: 0 | Status: SHIPPED | OUTPATIENT
Start: 2024-06-25

## 2024-07-12 ENCOUNTER — HOSPITAL ENCOUNTER (OUTPATIENT)
Dept: RADIATION ONCOLOGY | Age: 64
Discharge: HOME | End: 2024-07-12
Payer: MEDICARE

## 2024-07-12 VITALS
BODY MASS INDEX: 30.94 KG/M2 | SYSTOLIC BLOOD PRESSURE: 161 MMHG | RESPIRATION RATE: 16 BRPM | DIASTOLIC BLOOD PRESSURE: 74 MMHG | OXYGEN SATURATION: 96 % | WEIGHT: 158.4 LBS | HEART RATE: 69 BPM | TEMPERATURE: 97.4 F

## 2024-07-12 DIAGNOSIS — Z17.0 CARCINOMA OF UPPER-INNER QUADRANT OF LEFT BREAST IN FEMALE, ESTROGEN RECEPTOR POSITIVE (HCC): Primary | ICD-10-CM

## 2024-07-12 DIAGNOSIS — C50.212 CARCINOMA OF UPPER-INNER QUADRANT OF LEFT BREAST IN FEMALE, ESTROGEN RECEPTOR POSITIVE (HCC): Primary | ICD-10-CM

## 2024-07-12 PROCEDURE — 99214 OFFICE O/P EST MOD 30 MIN: CPT

## 2024-07-12 PROCEDURE — 99212 OFFICE O/P EST SF 10 MIN: CPT

## 2024-07-12 ASSESSMENT — ENCOUNTER SYMPTOMS
VOMITING: 0
BACK PAIN: 0
CONSTIPATION: 0
BLOOD IN STOOL: 0
RECTAL PAIN: 0
NAUSEA: 0
SHORTNESS OF BREATH: 0
COUGH: 0
ABDOMINAL PAIN: 0
DIARRHEA: 0
TROUBLE SWALLOWING: 0

## 2024-07-12 NOTE — PROGRESS NOTES
confusion.        CHAPERONE: n/a    PAIN: 0/10    LABORATORY STUDIES:  Onc labs: No results found for: \"PSA\", \"CEA\", \"LDH\", \"AFP\"    MEDICATIONS:   Current Outpatient Medications   Medication Sig Dispense Refill    letrozole (FEMARA) 2.5 MG tablet TAKE 1 TABLET BY MOUTH EVERY DAY 90 tablet 0    meloxicam (MOBIC) 7.5 MG tablet Take 1 tablet by mouth daily      sertraline (ZOLOFT) 25 MG tablet Take 1 tablet by mouth daily      fluticasone (FLOVENT HFA) 110 MCG/ACT inhaler Inhale 2 puffs into the lungs 2 times daily      cetirizine (ZYRTEC) 10 MG tablet Take 1 tablet by mouth daily       No current facility-administered medications for this encounter.       All portions of this note that were completed during the initial nursing assessment were discussed and reviewed in detail with the nursing staff member who completed this portion of the note and I agree with the information and assessment as written. A complete review of systems was performed and found to be negative except as presented above.    PHYSICAL EXAMINATION:  VITAL SIGNS: BP (!) 161/74   Pulse 69   Temp 97.4 °F (36.3 °C) (Infrared)   Resp 16   Wt 71.8 kg (158 lb 6.4 oz)   SpO2 96%   BMI 30.94 kg/m²     ECO - Asymptomatic (Fully active, able to carry on all pre-disease activities without restriction)    Physical Exam  Constitutional:       General: She is not in acute distress.     Appearance: Normal appearance.   HENT:      Head: Normocephalic and atraumatic.   Pulmonary:      Effort: Pulmonary effort is normal. No respiratory distress.   Chest:   Breasts:     Right: No swelling, bleeding, inverted nipple, mass, nipple discharge, skin change or tenderness.      Left: No swelling, bleeding, inverted nipple, nipple discharge, skin change or tenderness.      Comments: Left breast: Surgical scars well-healed. Mild asymmetry between the two nipples (small dried darkened areas of left nipple). Firm rim of soft tissue density near the lumpectomy scar

## 2024-09-23 RX ORDER — LETROZOLE 2.5 MG/1
2.5 TABLET, FILM COATED ORAL DAILY
Qty: 90 TABLET | Refills: 0 | Status: SHIPPED | OUTPATIENT
Start: 2024-09-23

## 2024-10-10 ENCOUNTER — OFFICE VISIT (OUTPATIENT)
Dept: SURGERY | Age: 64
End: 2024-10-10
Payer: MEDICARE

## 2024-10-10 VITALS
SYSTOLIC BLOOD PRESSURE: 138 MMHG | HEART RATE: 100 BPM | HEIGHT: 60 IN | BODY MASS INDEX: 31.12 KG/M2 | DIASTOLIC BLOOD PRESSURE: 84 MMHG | WEIGHT: 158.5 LBS | RESPIRATION RATE: 18 BRPM | TEMPERATURE: 97.5 F | OXYGEN SATURATION: 98 %

## 2024-10-10 DIAGNOSIS — M81.0 OSTEOPOROSIS WITHOUT CURRENT PATHOLOGICAL FRACTURE, UNSPECIFIED OSTEOPOROSIS TYPE: ICD-10-CM

## 2024-10-10 DIAGNOSIS — Z17.0 CARCINOMA OF UPPER-INNER QUADRANT OF LEFT BREAST IN FEMALE, ESTROGEN RECEPTOR POSITIVE (HCC): Primary | ICD-10-CM

## 2024-10-10 DIAGNOSIS — C50.212 CARCINOMA OF UPPER-INNER QUADRANT OF LEFT BREAST IN FEMALE, ESTROGEN RECEPTOR POSITIVE (HCC): Primary | ICD-10-CM

## 2024-10-10 PROCEDURE — 99214 OFFICE O/P EST MOD 30 MIN: CPT | Performed by: SURGERY

## 2024-10-10 PROCEDURE — 1123F ACP DISCUSS/DSCN MKR DOCD: CPT | Performed by: SURGERY

## 2024-10-10 RX ORDER — ATORVASTATIN CALCIUM 20 MG/1
20 TABLET, FILM COATED ORAL DAILY
COMMUNITY
Start: 2024-09-03

## 2024-10-10 NOTE — PROGRESS NOTES
for invasive carcinoma   Distance from Invasive Carcinoma to Closest Margin   Exact distance: 4 mm   Closest Margin(s) to Invasive Carcinoma   Cranial  Margin Status for DCIS  All margins negative for DCIS   Distance from DCIS to Closest Margin   Greater than: 4 mm   Closest Margin(s) to DCIS   Cranial    REGIONAL LYMPH NODES  Regional Lymph Node Status  Regional lymph nodes present   All regional lymph nodes negative for tumor  Total Number of Lymph Nodes Examined (sentinel and non-sentinel)   Exact number (specify): 1   Number of [place] Nodes Examined   Exact number (specify): 1    DISTANT METASTASIS  Distant Site(s) Involved, if applicable  Not applicable    pTNM CLASSIFICATION (AJCC 8th Edition)  Modified Classification  Not applicable  pT Category  pT1c: Tumor greater than 10 mm but less than or equal to 20 mm in  greatest dimension  T Suffix  Not applicable  pN Category  pN0: No regional lymph node metastasis identified or ITCs only#  N Suffix  (sn): [place] node(s) evaluated. If 6 or more nodes (sentinel or  nonsentinel) are removed, this modifier should not be used  pM Category  Not applicable - pM cannot be determined from the submitted specimen(s)      ADDITIONAL FINDINGS  Additional Findings (specify): biopsy site changes    SPECIAL STUDIES  Breast Biomarker Testing Performed on Previous Biopsy   Estrogen Receptor (ER) Status   Positive (greater than 10% of cells demonstrate nuclear positivity)     Percentage of Cells with Nuclear Positivity     Specify %: 100%   Progesterone Receptor (PgR) Status   Positive     Percentage of Cells with Nuclear Positivity#     Specify %: 100%   HER2 (by immunohistochemistry)   Negative (Score 0)   Ki-67 Percentage of Positive Nuclei: 5%   Testing Performed on Case Number: [id number]    [id number] x 2  [id number]                                                    <Sign Out Dr. Vasques>                                              GODFREY BOOKER D.O., F.C.A.P.      MELISSA/

## 2024-10-14 ASSESSMENT — ENCOUNTER SYMPTOMS
EYE REDNESS: 0
EYE PAIN: 0

## 2024-10-21 ENCOUNTER — HOSPITAL ENCOUNTER (OUTPATIENT)
Dept: WOMENS IMAGING | Age: 64
Discharge: HOME | End: 2024-10-21
Payer: MEDICARE

## 2024-10-21 ENCOUNTER — OFFICE VISIT (OUTPATIENT)
Dept: ONCOLOGY | Age: 64
End: 2024-10-21
Payer: MEDICARE

## 2024-10-21 ENCOUNTER — HOSPITAL ENCOUNTER (OUTPATIENT)
Dept: INFUSION THERAPY | Age: 64
Discharge: HOME | End: 2024-10-21
Payer: MEDICARE

## 2024-10-21 VITALS
DIASTOLIC BLOOD PRESSURE: 77 MMHG | HEART RATE: 85 BPM | RESPIRATION RATE: 16 BRPM | OXYGEN SATURATION: 97 % | SYSTOLIC BLOOD PRESSURE: 142 MMHG | TEMPERATURE: 98.4 F

## 2024-10-21 VITALS
OXYGEN SATURATION: 97 % | SYSTOLIC BLOOD PRESSURE: 142 MMHG | HEIGHT: 60 IN | TEMPERATURE: 98.4 F | DIASTOLIC BLOOD PRESSURE: 77 MMHG | HEART RATE: 85 BPM | WEIGHT: 159 LBS | BODY MASS INDEX: 31.22 KG/M2 | RESPIRATION RATE: 16 BRPM

## 2024-10-21 DIAGNOSIS — C50.212 CARCINOMA OF UPPER-INNER QUADRANT OF LEFT BREAST IN FEMALE, ESTROGEN RECEPTOR POSITIVE (HCC): ICD-10-CM

## 2024-10-21 DIAGNOSIS — R92.8 ABNORMAL MAMMOGRAM: ICD-10-CM

## 2024-10-21 DIAGNOSIS — N63.25 MASS OVERLAPPING MULTIPLE QUADRANTS OF LEFT BREAST: ICD-10-CM

## 2024-10-21 DIAGNOSIS — C50.212 CARCINOMA OF UPPER-INNER QUADRANT OF LEFT BREAST IN FEMALE, ESTROGEN RECEPTOR POSITIVE (HCC): Primary | ICD-10-CM

## 2024-10-21 DIAGNOSIS — Z17.0 CARCINOMA OF UPPER-INNER QUADRANT OF LEFT BREAST IN FEMALE, ESTROGEN RECEPTOR POSITIVE (HCC): Primary | ICD-10-CM

## 2024-10-21 DIAGNOSIS — Z09 FOLLOW-UP EXAM: Primary | ICD-10-CM

## 2024-10-21 DIAGNOSIS — Z17.0 CARCINOMA OF UPPER-INNER QUADRANT OF LEFT BREAST IN FEMALE, ESTROGEN RECEPTOR POSITIVE (HCC): ICD-10-CM

## 2024-10-21 PROCEDURE — 76642 ULTRASOUND BREAST LIMITED: CPT

## 2024-10-21 PROCEDURE — 77066 DX MAMMO INCL CAD BI: CPT

## 2024-10-21 PROCEDURE — 1123F ACP DISCUSS/DSCN MKR DOCD: CPT | Performed by: INTERNAL MEDICINE

## 2024-10-21 PROCEDURE — 99214 OFFICE O/P EST MOD 30 MIN: CPT | Performed by: INTERNAL MEDICINE

## 2024-10-21 PROCEDURE — 99211 OFF/OP EST MAY X REQ PHY/QHP: CPT

## 2024-10-21 RX ORDER — LETROZOLE 2.5 MG/1
2.5 TABLET, FILM COATED ORAL DAILY
Qty: 90 TABLET | Refills: 5 | Status: SHIPPED | OUTPATIENT
Start: 2024-10-21

## 2024-10-21 NOTE — PROGRESS NOTES
[facility] [place] [facility]  [facility]  [address]  [place] [address]  Dept: [phone]  Loc: [phone]   Hematology/Oncology Consult (Clinic)        10/21/24       Cooper Figueredo   1960     Shannan Ordoñez MD Wesson, Paul D II, DO       Reason:   Chief Complaint   Patient presents with    Follow-up     Carcinoma of upper-inner quadrant of left breast in female, estrogen receptor positive          HPI:   Patient is here on her own, information is obtained form the patient and from the chart.   Cooper is a 63 y.o. 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 She initially pressented to surgery after she underwent screening mammography at an outside facility.  There was an area of architectural distortion in the upper aspect of the left breast about 1.2 cm in diameter she underwent diagnostic imaging as well as an ultrasound.  She was referred to [facility] for biopsy.  She denied previous breast biopsies.  There were no abnormal appearing lymph nodes in the axilla on ultrasound.  She underwent an ultrasound-guided biopsyOn September 7, 2023.  A barbell clip was placed at the biopsy site in the upper inner aspect of the left breast.  Pathology revealed a low-grade invasive ductal carcinoma with associated DCIS.  It was Rene score 1.  Estrogen receptors +100%, progesterone receptor +100% Ki-67 was 5% and HER2 IHC was negative.  Patient denies any breast symptoms.  She then received  partial mastectomy on 9/27/2023 with Dr. Iraheta. Pathologic stage: pT1c, pN0 (sn). Today she is here for follow up.   - she is feeling well today, she fell prior to all this cancer diagnosis and had some knee pain which has resolved currently, there are no pains currently . She is recovering well from her surgery       Menarche age 11.  G2, P2. 1

## 2024-10-21 NOTE — PATIENT INSTRUCTIONS
Return in about 3 months (around 1/21/2025).    Orders Placed This Encounter   Procedures    MRI BREAST BILATERAL W WO CONTRAST   MRI is for 6 mos from now. She need not have the results of MRI prior to the next clinic visit  Orders Placed This Encounter   Medications    letrozole (FEMARA) 2.5 MG tablet     Sig: Take 1 tablet by mouth daily     Dispense:  90 tablet     Refill:  5

## 2025-01-02 ENCOUNTER — TELEPHONE (OUTPATIENT)
Dept: ONCOLOGY | Age: 65
End: 2025-01-02

## 2025-01-02 NOTE — TELEPHONE ENCOUNTER
Patient has called into office wanting to cancel appointment for Monday. We had a long discussion to reschedule appt. She states she spoke with Ambreen and she was going to mail her something and she was done. I explained to her about the need for the 6m follow up appointments and the possibility of cancer returning. She has agreed to reschedule and verbalized an understanding about surveillance

## 2025-03-13 ENCOUNTER — OFFICE VISIT (OUTPATIENT)
Dept: ONCOLOGY | Age: 65
End: 2025-03-13
Payer: MEDICARE

## 2025-03-13 ENCOUNTER — HOSPITAL ENCOUNTER (OUTPATIENT)
Dept: INFUSION THERAPY | Age: 65
Discharge: HOME | End: 2025-03-13
Payer: MEDICARE

## 2025-03-13 VITALS
BODY MASS INDEX: 32 KG/M2 | HEART RATE: 95 BPM | TEMPERATURE: 98.4 F | HEIGHT: 60 IN | OXYGEN SATURATION: 97 % | DIASTOLIC BLOOD PRESSURE: 79 MMHG | WEIGHT: 163 LBS | RESPIRATION RATE: 18 BRPM | SYSTOLIC BLOOD PRESSURE: 168 MMHG

## 2025-03-13 VITALS
BODY MASS INDEX: 32 KG/M2 | HEIGHT: 60 IN | WEIGHT: 163 LBS | HEART RATE: 95 BPM | TEMPERATURE: 98.4 F | DIASTOLIC BLOOD PRESSURE: 79 MMHG | OXYGEN SATURATION: 97 % | RESPIRATION RATE: 18 BRPM | SYSTOLIC BLOOD PRESSURE: 168 MMHG

## 2025-03-13 DIAGNOSIS — Z17.0 CARCINOMA OF UPPER-INNER QUADRANT OF LEFT BREAST IN FEMALE, ESTROGEN RECEPTOR POSITIVE (HCC): Primary | ICD-10-CM

## 2025-03-13 DIAGNOSIS — C50.212 CARCINOMA OF UPPER-INNER QUADRANT OF LEFT BREAST IN FEMALE, ESTROGEN RECEPTOR POSITIVE (HCC): Primary | ICD-10-CM

## 2025-03-13 PROCEDURE — 1123F ACP DISCUSS/DSCN MKR DOCD: CPT | Performed by: INTERNAL MEDICINE

## 2025-03-13 PROCEDURE — 99213 OFFICE O/P EST LOW 20 MIN: CPT | Performed by: INTERNAL MEDICINE

## 2025-03-13 PROCEDURE — 99211 OFF/OP EST MAY X REQ PHY/QHP: CPT

## 2025-03-13 PROCEDURE — 1159F MED LIST DOCD IN RCRD: CPT | Performed by: INTERNAL MEDICINE

## 2025-03-13 PROCEDURE — 1126F AMNT PAIN NOTED NONE PRSNT: CPT | Performed by: INTERNAL MEDICINE

## 2025-03-13 RX ORDER — MOMETASONE FUROATE 200 UG/1
2 AEROSOL RESPIRATORY (INHALATION) DAILY
COMMUNITY

## 2025-03-13 NOTE — PROGRESS NOTES
mammographic images demonstrates the wires in the appropriate position. 3. Specimen radiograph demonstrates the 3 wires, barbell biopsy clip , the mass and the MRI region of interest within the specimen. BI-RADS Final Assessment Category: Not applicable. **This report has been created using voice recognition software. It may contain minor errors which are inherent in voice recognition technology.** Final report electronically signed by Dr. Mariela Flores on 9/27/2023 2:01 PM     JOVON POST BX CLIP PLACEMENT LEFT    Addendum Date: 9/27/2023    ADDENDUM #1 * COMPARISON: 9/13/2023, 9/6/2023, 8/23/2023 and 8/11/2023. Final report electronically signed by Dr. Mariela Flores on 9/27/2023 3:15 ORIGINAL REPORT * LOCATION: [place] PROCEDURE: MRI BREAST LOC DEVICE PLACEMENT LEFT 1ST LESION, MAMMOGRAM POST BX CLIP PLACEMENT LEFT, US PLACE BREAST LOC DEVICE 1ST LESION LEFT, JOVON BREAST SPECIMEN CLINICAL INFORMATION: Malignant neoplasm of upper-inner quadrant of left breast in female, estrogen receptor positive (HCC), Malignant neoplasm of upper-inner quadrant of left breast in female, estrogen receptor positive (HCC) . COMPARISON: There are no prior exams for comparison. EXAMS: 1. Needle-wire localization, left  breast, , 2 sites . 2. Ultrasound and MRI guidance for needle-wire placement, imaging supervision and interpretation. 3. Post wire left digital diagnostic mammogram. 4. Specimen radiograph. Performing and interpreting physician:  Mariela Flores MD WIRE LOCALIZATION: Written, informed consent was obtained, including discussion of the risks, benefits and alternatives. The patient's left breast was marked by the physician with initials. A timeout was performed in ultrasound and MRI including the patient's name, date of  birth, procedure and laterality. MRI localization: The lesion was localized with axial pre and post contrast MRI images of the left breast.  ProHance contrast was administered intravenously. The lesion was

## 2025-03-13 NOTE — PATIENT INSTRUCTIONS
Calcium( 1200 mg) daily and Vitamin D ( 800-1000 IU) daily.  Return in about 5 weeks (around 4/17/2025).

## 2025-04-21 ENCOUNTER — HOSPITAL ENCOUNTER (OUTPATIENT)
Dept: WOMENS IMAGING | Age: 65
Discharge: HOME | End: 2025-04-21
Attending: RADIOLOGY
Payer: MEDICARE

## 2025-04-21 ENCOUNTER — HOSPITAL ENCOUNTER (OUTPATIENT)
Dept: MRI IMAGING | Age: 65
Discharge: HOME | End: 2025-04-21
Attending: INTERNAL MEDICINE
Payer: MEDICARE

## 2025-04-21 DIAGNOSIS — C50.212 CARCINOMA OF UPPER-INNER QUADRANT OF LEFT BREAST IN FEMALE, ESTROGEN RECEPTOR POSITIVE (HCC): ICD-10-CM

## 2025-04-21 DIAGNOSIS — Z17.0 CARCINOMA OF UPPER-INNER QUADRANT OF LEFT BREAST IN FEMALE, ESTROGEN RECEPTOR POSITIVE (HCC): ICD-10-CM

## 2025-04-21 DIAGNOSIS — Z09 FOLLOW-UP EXAM: ICD-10-CM

## 2025-04-21 DIAGNOSIS — R92.8 ABNORMAL MAMMOGRAM: ICD-10-CM

## 2025-04-21 LAB — POC CREATININE WHOLE BLOOD: 0.9 MG/DL (ref 0.5–1.2)

## 2025-04-21 PROCEDURE — 6360000004 HC RX CONTRAST MEDICATION: Performed by: INTERNAL MEDICINE

## 2025-04-21 PROCEDURE — 82565 ASSAY OF CREATININE: CPT

## 2025-04-21 PROCEDURE — C8908 MRI W/O FOL W/CONT, BREAST,: HCPCS

## 2025-04-21 PROCEDURE — 76642 ULTRASOUND BREAST LIMITED: CPT

## 2025-04-21 PROCEDURE — A9579 GAD-BASE MR CONTRAST NOS,1ML: HCPCS | Performed by: INTERNAL MEDICINE

## 2025-04-21 PROCEDURE — G0279 TOMOSYNTHESIS, MAMMO: HCPCS

## 2025-04-21 RX ADMIN — GADOTERIDOL 15 ML: 279.3 INJECTION, SOLUTION INTRAVENOUS at 12:43

## 2025-05-07 ENCOUNTER — OFFICE VISIT (OUTPATIENT)
Dept: ONCOLOGY | Age: 65
End: 2025-05-07
Payer: MEDICARE

## 2025-05-07 ENCOUNTER — HOSPITAL ENCOUNTER (OUTPATIENT)
Dept: INFUSION THERAPY | Age: 65
Discharge: HOME | End: 2025-05-07
Payer: MEDICARE

## 2025-05-07 VITALS
SYSTOLIC BLOOD PRESSURE: 136 MMHG | RESPIRATION RATE: 16 BRPM | BODY MASS INDEX: 29.81 KG/M2 | HEIGHT: 62 IN | TEMPERATURE: 98.3 F | HEART RATE: 98 BPM | DIASTOLIC BLOOD PRESSURE: 69 MMHG | WEIGHT: 162 LBS | OXYGEN SATURATION: 96 %

## 2025-05-07 VITALS
RESPIRATION RATE: 16 BRPM | DIASTOLIC BLOOD PRESSURE: 69 MMHG | HEART RATE: 98 BPM | TEMPERATURE: 98.3 F | SYSTOLIC BLOOD PRESSURE: 136 MMHG | OXYGEN SATURATION: 96 %

## 2025-05-07 DIAGNOSIS — C50.212 CARCINOMA OF UPPER-INNER QUADRANT OF LEFT BREAST IN FEMALE, ESTROGEN RECEPTOR POSITIVE (HCC): Primary | ICD-10-CM

## 2025-05-07 DIAGNOSIS — Z17.0 CARCINOMA OF UPPER-INNER QUADRANT OF LEFT BREAST IN FEMALE, ESTROGEN RECEPTOR POSITIVE (HCC): Primary | ICD-10-CM

## 2025-05-07 PROCEDURE — 1123F ACP DISCUSS/DSCN MKR DOCD: CPT | Performed by: INTERNAL MEDICINE

## 2025-05-07 PROCEDURE — 99214 OFFICE O/P EST MOD 30 MIN: CPT | Performed by: INTERNAL MEDICINE

## 2025-05-07 PROCEDURE — 1159F MED LIST DOCD IN RCRD: CPT | Performed by: INTERNAL MEDICINE

## 2025-05-07 PROCEDURE — 99211 OFF/OP EST MAY X REQ PHY/QHP: CPT

## 2025-05-07 PROCEDURE — 1126F AMNT PAIN NOTED NONE PRSNT: CPT | Performed by: INTERNAL MEDICINE

## 2025-05-07 NOTE — PROGRESS NOTES
goal of decreasing the chance of recurrence locally and systemically.   - Healthy life style and regular exercise discussed.  - Common side effects of letrozole  include hot flashes, fatigue, joint and muscle pain, headache, and nausea.  - Less common side effects may include bone thinning (osteoporosis), increased cholesterol levels, mood changes, vaginal dryness or irritation, hair thinning or loss, decreased libido, and an increased risk of blood clots.  - explained aromatase inhibitors and possible side effects in detail.      Surveillance protocol: MMG yearly( if the MRI in 6 mos is without concerning findings)      2025  - Tolerating letrozole fairly well.  - MMG bilateral and US ( breast limited) 10/21/24: The palpable area of concern is likely an area of fat necrosis.   - MRI breast: No definite MR evidence of malignancy. There is some enhancement along the  anterior and posterior margins of the surgical resection site in the upper left breast. This most consistent with enhancing fat necrosis. This area has been improving in appearance mammographically and by ultrasound.  - US 4/21/25: KAYLA. MMG( left): KAYLA.  - MRI breast ordered for 6-month.  Moving forward with switch to yearly mammogram, if MRI does not show any significant changes.  - Pending dental clearance due to insurance issues.(Tentative plan for dental evaluation May/ June 2025)  - Emphasized importance of compliance with calcium 1200 mg daily and vitamin D 800 - 1000 IU daily.    ## bone health:   Has osteoprosis on recent DEXA  Cont  ca/vit D ( soft chews) and possible reclast or denosumab which she will think about  Authorization for reclast complete.  dental clearance form reviewed,( media tab)  multiple non restorable teeth will need extraction. Pt should complete her planned extraction and clearance should be obtained before proceeding with bisphosphonate therapy.  Plan to initiate therapy with bisphosphanate after clearance is obtained.(

## 2025-05-07 NOTE — PATIENT INSTRUCTIONS
Orders Placed This Encounter   Procedures    MRI BREAST BILATERAL W WO CONTRAST    CBC with Auto Differential    Hepatic Function Panel    POC PANEL BMP W/IOCA   Return in about 27 weeks (around 11/12/2025).MD visit + labs and imaging must be obtained one week prior to the scheduled clinic visit

## 2025-06-02 ENCOUNTER — SOCIAL WORK (OUTPATIENT)
Dept: INFUSION THERAPY | Age: 65
End: 2025-06-02

## 2025-06-02 NOTE — PROGRESS NOTES
-This staff called Cooper again this morning as a courtesy follow-up to make sure the information needed from the Financial [employer] program was provided to them. She confirmed taking her tax return over last week.   - We went on to discuss the request of Dr Ordoñez for a letter stating her diagnosis for osteoporosis. Apparently the Medicare rep feels she may be able to get additional funding (return of some of the funds they have paid in) if they can provide this letter. She was offered my follow-up assist however it wasn't necessary because the call/request was only placed this morning.   - This staff will remain available foe support as needed. .

## (undated) DEVICE — BREAST HERNIA: Brand: MEDLINE INDUSTRIES, INC.

## (undated) DEVICE — SUTURE VCRL + SZ 3-0 L27IN ABSRB UD L26MM SH 1/2 CIR VCP416H

## (undated) DEVICE — GLOVE SURG 7 PF POLYMER COAT WHT STRL SIGN LTX ESSENTIAL LTX

## (undated) DEVICE — SUTURE VCRL + SZ 2-0 L27IN ABSRB WHT SH 1/2 CIR TAPERCUT VCP417H

## (undated) DEVICE — APPLIER LIG CLP M L11IN TI STR RNG HNDL FOR 20 CLP DISP

## (undated) DEVICE — LIQUIBAND RAPID ADHESIVE 36/CS 0.8ML: Brand: MEDLINE

## (undated) DEVICE — PROVE COVER: Brand: UNBRANDED

## (undated) DEVICE — SPECIMEN ORIENTATION CHARMS, SIX DISTINCTLY SHAPED STERILE 10MM CHARMS: Brand: MARGINMAP

## (undated) DEVICE — PENCIL SMK EVAC ALL IN 1 DSGN ENH VISIBILITY IMPROVED AIR

## (undated) DEVICE — SUTURE MCRYL SZ 4-0 L27IN ABSRB UD L19MM PS-2 1/2 CIR PRIM Y426H